# Patient Record
Sex: FEMALE | Race: WHITE | Employment: FULL TIME | URBAN - METROPOLITAN AREA
[De-identification: names, ages, dates, MRNs, and addresses within clinical notes are randomized per-mention and may not be internally consistent; named-entity substitution may affect disease eponyms.]

---

## 2024-04-06 ENCOUNTER — TELEPHONE (OUTPATIENT)
Dept: FAMILY MEDICINE CLINIC | Age: 49
End: 2024-04-06

## 2024-05-10 SDOH — HEALTH STABILITY: PHYSICAL HEALTH: ON AVERAGE, HOW MANY DAYS PER WEEK DO YOU ENGAGE IN MODERATE TO STRENUOUS EXERCISE (LIKE A BRISK WALK)?: 0 DAYS

## 2024-05-13 ENCOUNTER — OFFICE VISIT (OUTPATIENT)
Dept: FAMILY MEDICINE CLINIC | Age: 49
End: 2024-05-13
Payer: COMMERCIAL

## 2024-05-13 VITALS
SYSTOLIC BLOOD PRESSURE: 110 MMHG | WEIGHT: 170 LBS | DIASTOLIC BLOOD PRESSURE: 70 MMHG | HEART RATE: 85 BPM | HEIGHT: 64 IN | BODY MASS INDEX: 29.02 KG/M2 | OXYGEN SATURATION: 98 %

## 2024-05-13 DIAGNOSIS — Z11.59 ENCOUNTER FOR SCREENING FOR OTHER VIRAL DISEASES: ICD-10-CM

## 2024-05-13 DIAGNOSIS — G43.919 INTRACTABLE MIGRAINE WITHOUT STATUS MIGRAINOSUS, UNSPECIFIED MIGRAINE TYPE: Primary | ICD-10-CM

## 2024-05-13 DIAGNOSIS — L65.9 FALLING HAIR: ICD-10-CM

## 2024-05-13 DIAGNOSIS — Z90.3 S/P GASTRIC SLEEVE PROCEDURE: ICD-10-CM

## 2024-05-13 DIAGNOSIS — Z76.89 ENCOUNTER FOR WEIGHT MANAGEMENT: ICD-10-CM

## 2024-05-13 DIAGNOSIS — Z12.11 COLON CANCER SCREENING: ICD-10-CM

## 2024-05-13 DIAGNOSIS — K58.1 IRRITABLE BOWEL SYNDROME WITH CONSTIPATION: ICD-10-CM

## 2024-05-13 DIAGNOSIS — Z12.31 SCREENING MAMMOGRAM FOR HIGH-RISK PATIENT: ICD-10-CM

## 2024-05-13 PROCEDURE — 99204 OFFICE O/P NEW MOD 45 MIN: CPT | Performed by: FAMILY MEDICINE

## 2024-05-13 SDOH — ECONOMIC STABILITY: HOUSING INSECURITY
IN THE LAST 12 MONTHS, WAS THERE A TIME WHEN YOU DID NOT HAVE A STEADY PLACE TO SLEEP OR SLEPT IN A SHELTER (INCLUDING NOW)?: NO

## 2024-05-13 SDOH — ECONOMIC STABILITY: FOOD INSECURITY: WITHIN THE PAST 12 MONTHS, THE FOOD YOU BOUGHT JUST DIDN'T LAST AND YOU DIDN'T HAVE MONEY TO GET MORE.: NEVER TRUE

## 2024-05-13 SDOH — ECONOMIC STABILITY: FOOD INSECURITY: WITHIN THE PAST 12 MONTHS, YOU WORRIED THAT YOUR FOOD WOULD RUN OUT BEFORE YOU GOT MONEY TO BUY MORE.: NEVER TRUE

## 2024-05-13 SDOH — ECONOMIC STABILITY: INCOME INSECURITY: HOW HARD IS IT FOR YOU TO PAY FOR THE VERY BASICS LIKE FOOD, HOUSING, MEDICAL CARE, AND HEATING?: NOT HARD AT ALL

## 2024-05-13 ASSESSMENT — PATIENT HEALTH QUESTIONNAIRE - PHQ9
2. FEELING DOWN, DEPRESSED OR HOPELESS: NOT AT ALL
SUM OF ALL RESPONSES TO PHQ QUESTIONS 1-9: 0
1. LITTLE INTEREST OR PLEASURE IN DOING THINGS: NOT AT ALL
SUM OF ALL RESPONSES TO PHQ QUESTIONS 1-9: 0
SUM OF ALL RESPONSES TO PHQ9 QUESTIONS 1 & 2: 0

## 2024-05-13 ASSESSMENT — ENCOUNTER SYMPTOMS
ABDOMINAL DISTENTION: 0
SORE THROAT: 0
CHEST TIGHTNESS: 0
SHORTNESS OF BREATH: 0
CONSTIPATION: 0
NAUSEA: 0
BLOOD IN STOOL: 0
WHEEZING: 0
VOMITING: 0
ABDOMINAL PAIN: 0
EYE REDNESS: 0
RHINORRHEA: 0
COLOR CHANGE: 0
TROUBLE SWALLOWING: 0
DIARRHEA: 0
BACK PAIN: 0
SINUS PRESSURE: 0
STRIDOR: 0

## 2024-05-13 NOTE — PROGRESS NOTES
Chief Complaint   Patient presents with    New Patient    Weight Management     Has done nothing different from last year     Alopecia     Hair loss     Other         Leonor BOWMAN  here today for follow up on chronic medical problems, go over labs and/or diagnostic studies, and medication refills. New Patient, Weight Management (Has done nothing different from last year ), Alopecia (Hair loss ), and Other      HPI: Patient is scheduled to establish.  Patient reports she was at [employer] in the past .    Patient has history of migraine headaches follows with neurologist and is currently doing Botox.  Patient reports she was started on the Nurtec but insurance is not covering that.      Patient reports she had gastric bypass done about 4 years before and has lost about 90 pounds.  She has not been following with gastric bypass surgeon neither she had any recent blood work done.  Patient reports she has gained about 20 pounds.  She was down to 150s and today's weight is 170.  Patient reports she is still continuing the exercise around 10,000 steps not doing any resistance training.  Patient reports she still watches diet and takes portion plates but she has been persistently gaining weight.    Patient wants to discuss about the weight loss, reports she does not want to gain more weight.     Patient also complains of hair fall reports she had COVID in November and since then her hair is falling.  She is not taking any multivitamins.  Patient denies any patchy hair loss.      Patient has irritable bowel syndrome follows with GI and is currently on Linzess.  Patient reports she has colonoscopy done results are in care everywhere.      Patient is also due for mammogram did not had any since last few years.        Patient she has hysterectomy done and follows with gynecologist.  Patient does not qualify for Pap smear.  Reports she had recent pelvic exam done.      /70   Pulse 85   Ht 1.626 m (5' 4.02\")   Wt

## 2024-05-13 NOTE — PROGRESS NOTES
Visit Information    Have you changed or started any medications since your last visit including any over-the-counter medicines, vitamins, or herbal medicines? no   Are you having any side effects from any of your medications? -  no  Have you stopped taking any of your medications? Is so, why? -  no    Have you seen any other physician or provider since your last visit? No  Have you had any other diagnostic tests since your last visit? No  Have you been seen in the emergency room and/or had an admission to a hospital since we last saw you? No  Have you had your routine dental cleaning in the past 6 months? no    Have you activated your [employer] account? If not, what are your barriers? Yes     Patient Care Team:  Rolo Elias MD as PCP - General (Family Medicine)  Surendra Sun DO as Consulting Physician (Obstetrics & Gynecology)    Medical History Review  Past Medical, Family, and Social History reviewed and does contribute to the patient presenting condition    Health Maintenance   Topic Date Due    Depression Screen  Never done    Hepatitis C screen  Never done    Hepatitis B vaccine (3 of 3 - 19+ 3-dose series) 11/20/2009    Lipids  Never done    Breast cancer screen  03/14/2019    Colorectal Cancer Screen  Never done    COVID-19 Vaccine (6 - 2023-24 season) 09/01/2023    Flu vaccine (Season Ended) 08/01/2024    DTaP/Tdap/Td vaccine (2 - Td or Tdap) 10/07/2027    HIV screen  Completed    Hepatitis A vaccine  Aged Out    Hib vaccine  Aged Out    Polio vaccine  Aged Out    Meningococcal (ACWY) vaccine  Aged Out    Pneumococcal 0-64 years Vaccine  Aged Out    Cervical cancer screen  Discontinued

## 2024-05-13 NOTE — PATIENT INSTRUCTIONS
Dear valued patient,    We hope this message finds you in good health. At [], we are committed to providing you with the best possible healthcare experience. To further enhance your convenience and streamline your access to our services, we would like to introduce you to the benefits of utilizing direct scheduling through the [facility] Patient Portal.    Direct scheduling is a feature within the [facility] Patient Portal that allows you to schedule appointments with your healthcare provider directly, without the need to make a phone call or wait for a callback. We understand that your time is rina, and we want to ensure that you have quick and easy access to our services whenever you need them.  Here are some reasons why you should consider utilizing direct scheduling through the [facility] Patient Portal:    1. Convenience: With direct scheduling, you can book appointments at any time that suits you best, 24 hours a day, 7 days a week. No more waiting on hold or playing phone tag with our office staff. It puts you in control of managing your healthcare appointments effortlessly.    2. Accessibility: The [facility] Patient Portal is accessible through your computer, smartphone, or tablet, allowing you to schedule appointments from the comfort of your home, office, or on the go. You can access your medical records, review test results, and communicate with your healthcare provider all in one secure and user-friendly platform.    3. Timesaving: By utilizing direct scheduling, you can avoid unnecessary delays and save rina time. You can easily browse the available appointment slots and select the one that works best for you, eliminating the back-and-forth communication typically required when scheduling via phone.    4. Reminders and notifications: [facility] sends automatic reminders for upcoming appointments, ensuring that you never miss an important visit. You can also receive notifications about test

## 2024-05-29 ENCOUNTER — HOSPITAL ENCOUNTER (OUTPATIENT)
Age: 49
Setting detail: SPECIMEN
Discharge: HOME | End: 2024-05-29

## 2024-05-29 DIAGNOSIS — Z90.3 S/P GASTRIC SLEEVE PROCEDURE: ICD-10-CM

## 2024-05-29 DIAGNOSIS — Z11.59 ENCOUNTER FOR SCREENING FOR OTHER VIRAL DISEASES: ICD-10-CM

## 2024-05-29 LAB
25(OH)D3 SERPL-MCNC: 21.4 NG/ML (ref 30–100)
ALBUMIN SERPL-MCNC: 4.3 G/DL (ref 3.5–5.2)
ALBUMIN/GLOB SERPL: 2 {RATIO} (ref 1–2.5)
ALP SERPL-CCNC: 64 U/L (ref 35–104)
ALT SERPL-CCNC: 12 U/L (ref 10–35)
ANION GAP SERPL CALCULATED.3IONS-SCNC: 10 MMOL/L (ref 9–16)
AST SERPL-CCNC: 24 U/L (ref 10–35)
BILIRUB SERPL-MCNC: 0.7 MG/DL (ref 0–1.2)
BILIRUB UR QL STRIP: NEGATIVE
BUN SERPL-MCNC: 15 MG/DL (ref 6–20)
CALCIUM SERPL-MCNC: 8.4 MG/DL (ref 8.6–10.4)
CHLORIDE SERPL-SCNC: 105 MMOL/L (ref 98–107)
CHOLEST SERPL-MCNC: 150 MG/DL (ref 0–199)
CHOLESTEROL/HDL RATIO: 2
CLARITY UR: CLEAR
CO2 SERPL-SCNC: 25 MMOL/L (ref 20–31)
COLOR UR: YELLOW
COMMENT: NORMAL
CREAT SERPL-MCNC: 0.9 MG/DL (ref 0.5–0.9)
ERYTHROCYTE [DISTWIDTH] IN BLOOD BY AUTOMATED COUNT: 16.1 % (ref 11.8–14.4)
EST. AVERAGE GLUCOSE BLD GHB EST-MCNC: 85 MG/DL
FOLATE SERPL-MCNC: 11.3 NG/ML (ref 4.8–24.2)
GFR, ESTIMATED: 84 ML/MIN/1.73M2
GLUCOSE SERPL-MCNC: 72 MG/DL (ref 74–99)
GLUCOSE UR STRIP-MCNC: NEGATIVE MG/DL
HBA1C MFR BLD: 4.6 % (ref 4–6)
HBV SURFACE AB SERPL IA-ACNC: 67.6 MIU/ML
HCT VFR BLD AUTO: 34.8 % (ref 36.3–47.1)
HCV AB SERPL QL IA: NONREACTIVE
HDLC SERPL-MCNC: 100 MG/DL
HGB BLD-MCNC: 10.2 G/DL (ref 11.9–15.1)
HGB UR QL STRIP.AUTO: NEGATIVE
KETONES UR STRIP-MCNC: NEGATIVE MG/DL
LDLC SERPL CALC-MCNC: 38 MG/DL (ref 0–100)
LEUKOCYTE ESTERASE UR QL STRIP: NEGATIVE
MAGNESIUM SERPL-MCNC: 1.9 MG/DL (ref 1.6–2.6)
MCH RBC QN AUTO: 24.2 PG (ref 25.2–33.5)
MCHC RBC AUTO-ENTMCNC: 29.3 G/DL (ref 28.4–34.8)
MCV RBC AUTO: 82.5 FL (ref 82.6–102.9)
NITRITE UR QL STRIP: NEGATIVE
NRBC BLD-RTO: 0 PER 100 WBC
PH UR STRIP: 6.5 [PH] (ref 5–8)
PLATELET # BLD AUTO: 220 K/UL (ref 138–453)
PMV BLD AUTO: 10.6 FL (ref 8.1–13.5)
POTASSIUM SERPL-SCNC: 3.9 MMOL/L (ref 3.7–5.3)
PROT SERPL-MCNC: 6.9 G/DL (ref 6.6–8.7)
PROT UR STRIP-MCNC: NEGATIVE MG/DL
RBC # BLD AUTO: 4.22 M/UL (ref 3.95–5.11)
SODIUM SERPL-SCNC: 140 MMOL/L (ref 136–145)
SP GR UR STRIP: 1.01 (ref 1–1.03)
TRIGL SERPL-MCNC: 62 MG/DL
TSH SERPL DL<=0.05 MIU/L-ACNC: 1.38 UIU/ML (ref 0.27–4.2)
URATE SERPL-MCNC: 3.5 MG/DL (ref 2.4–5.7)
UROBILINOGEN UR STRIP-ACNC: NORMAL EU/DL (ref 0–1)
VIT B12 SERPL-MCNC: 430 PG/ML (ref 232–1245)
VLDLC SERPL CALC-MCNC: 12 MG/DL
WBC OTHER # BLD: 3 K/UL (ref 3.5–11.3)

## 2024-06-01 LAB — ZINC SERPL-MCNC: 75.6 UG/DL (ref 60–120)

## 2024-06-02 LAB
RETINYL PALMITATE: <0.02 MG/L (ref 0–0.1)
VITAMIN A LEVEL: 0.49 MG/L (ref 0.3–1.2)
VITAMIN A, INTERP: NORMAL

## 2024-06-03 DIAGNOSIS — E55.9 VITAMIN D DEFICIENCY: ICD-10-CM

## 2024-06-03 DIAGNOSIS — D50.8 IRON DEFICIENCY ANEMIA SECONDARY TO INADEQUATE DIETARY IRON INTAKE: Primary | ICD-10-CM

## 2024-06-03 LAB — VIT B1 PYROPHOSHATE BLD-SCNC: 92 NMOL/L (ref 70–180)

## 2024-06-03 RX ORDER — FERROUS SULFATE 325(65) MG
325 TABLET ORAL 2 TIMES DAILY
Qty: 180 TABLET | Refills: 1 | Status: SHIPPED | OUTPATIENT
Start: 2024-06-03

## 2024-06-03 RX ORDER — ERGOCALCIFEROL 1.25 MG/1
50000 CAPSULE ORAL WEEKLY
Qty: 12 CAPSULE | Refills: 1 | Status: SHIPPED | OUTPATIENT
Start: 2024-06-03

## 2024-07-29 NOTE — PROGRESS NOTES
of water per day unless you are limiting fluids under the direction of a healthcare professional.  Take abortive medications as soon as possible with a full glass of water.     2.  Return to clinic ASAP for Botox.        I personally reviewed all scans and labs unless otherwise stated above.  Notes from other providers were reviewed prior to this visit as well.  All of the above findings and plans were discussed with the patient.       Thank you for allowing us to participate in the care of this nice patient.     Sincerely,     Jolanta Little DO  Neurology  Director of Multiple Sclerosis & Neuroimmunology  [facility]         [Chaperone Present] : A chaperone was present in the examining room during all aspects of the physical examination [Appropriately responsive] : appropriately responsive [Alert] : alert [No Acute Distress] : no acute distress [Oriented x3] : oriented x3

## 2024-07-30 ENCOUNTER — TELEPHONE (OUTPATIENT)
Dept: NEUROLOGY | Age: 49
End: 2024-07-30

## 2024-07-30 ENCOUNTER — OFFICE VISIT (OUTPATIENT)
Dept: NEUROLOGY | Age: 49
End: 2024-07-30
Payer: COMMERCIAL

## 2024-07-30 VITALS
SYSTOLIC BLOOD PRESSURE: 145 MMHG | OXYGEN SATURATION: 100 % | RESPIRATION RATE: 16 BRPM | HEART RATE: 56 BPM | BODY MASS INDEX: 29.71 KG/M2 | HEIGHT: 64 IN | WEIGHT: 174 LBS | DIASTOLIC BLOOD PRESSURE: 89 MMHG

## 2024-07-30 DIAGNOSIS — G43.709 CHRONIC MIGRAINE W/O AURA W/O STATUS MIGRAINOSUS, NOT INTRACTABLE: Primary | ICD-10-CM

## 2024-07-30 DIAGNOSIS — G43.109 MIGRAINE WITH AURA AND WITHOUT STATUS MIGRAINOSUS, NOT INTRACTABLE: ICD-10-CM

## 2024-07-30 PROCEDURE — 99204 OFFICE O/P NEW MOD 45 MIN: CPT | Performed by: PSYCHIATRY & NEUROLOGY

## 2024-07-30 RX ORDER — ESTRADIOL 0.1 MG/D
1 FILM, EXTENDED RELEASE TRANSDERMAL
COMMUNITY

## 2024-07-30 NOTE — TELEPHONE ENCOUNTER
----- Message from Jolanta AKBAR DO sent at 7/30/2024  2:59 PM EDT -----  Regarding: Botox and Nurtec  Can you guys help me get her Botox ASAP?  She is due now.  She also needs a prior auth for Nurtec.     Thanks!    AVB

## 2024-08-01 ENCOUNTER — TELEPHONE (OUTPATIENT)
Dept: FAMILY MEDICINE CLINIC | Age: 49
End: 2024-08-01

## 2024-08-01 ENCOUNTER — TELEPHONE (OUTPATIENT)
Dept: NEUROLOGY | Age: 49
End: 2024-08-01

## 2024-08-01 NOTE — TELEPHONE ENCOUNTER
I called Leonor to verify her insurance because she had both [place] and [place] listedf. She said she has only [employer].   That does not require a prior authorization. Will check with dr. Little if she wants to work her in on Monday or Wednesday of next week or the following Monday.

## 2024-08-02 ENCOUNTER — TELEPHONE (OUTPATIENT)
Dept: NEUROLOGY | Age: 49
End: 2024-08-02

## 2024-08-05 ENCOUNTER — OFFICE VISIT (OUTPATIENT)
Dept: NEUROLOGY | Age: 49
End: 2024-08-05
Payer: COMMERCIAL

## 2024-08-05 DIAGNOSIS — G43.709 CHRONIC MIGRAINE W/O AURA W/O STATUS MIGRAINOSUS, NOT INTRACTABLE: Primary | ICD-10-CM

## 2024-08-05 PROCEDURE — 64615 CHEMODENERV MUSC MIGRAINE: CPT | Performed by: PSYCHIATRY & NEUROLOGY

## 2024-08-05 NOTE — PROGRESS NOTES
[facility]  [address], [address]  [place] [address]  Ph: [phone] or [phone]  FAX: [phone]        Procedure: Chemodenervation CPT Code 03891    Indication for treatment: Chronic migraine (ICD 10 G43.709)  Consent form was signed.      Potential risks and benefits for the procedure were explained to the patient.      Procedure: 30-gauge half inch needle was used and 200 U vial Botox was prepared with 4ml 0.9% NS.155 units of Botox were used and 45 units of Botox were discarded.     Procedure: 30-gauge half inch needle was used and 200 U vial Botox was prepared with 4ml 0.9% NS.155 units of Botox were used and 45 units of Botox were discarded.     Botox was injected at 31 different sites as follows:   Order  Muscle  Units injected    A  Corrugator1  10 units at 2 div sites    B  Procerus  5 Units in 1 site    C  Frontalis¹  20 Units div. 4 sites    D  Temporalis¹  40 Units div 8 site    E  Occipitalis¹  30 Units div. 6 sites    F  Cervical paraspinal muscles¹  20 Units div 4 sites    G  Trapezius¹  30 Units div 6 sites    Total Dose  155 Units div 31 sites    2 Dose distributed bilaterally    Blood loss: Less than 1 cc    Complications: none    Disposition: Post-botox instructions were reviewed and provided to the patient. Patient was advised to seek medical care for any generalized muscle weakness, double vision, ptosis, trouble swallowing, difficulty talking or difficulty breathing.  The patient will return in 3 months for subsequent botox treatments.       Jolanta Little DO   Neurology   Director of Multiple Sclerosis & Neuroimmunology  [facility]

## 2024-08-06 NOTE — TELEPHONE ENCOUNTER
PA approved through 2/1/2025 for 18 tablets per 30 days.     Attempted to notify patient; a message was left with this information as well as the office phone number in the event there are further questions.

## 2024-09-10 ENCOUNTER — OFFICE VISIT (OUTPATIENT)
Dept: FAMILY MEDICINE CLINIC | Age: 49
End: 2024-09-10
Payer: COMMERCIAL

## 2024-09-10 VITALS
OXYGEN SATURATION: 98 % | DIASTOLIC BLOOD PRESSURE: 80 MMHG | HEIGHT: 64 IN | BODY MASS INDEX: 29.71 KG/M2 | SYSTOLIC BLOOD PRESSURE: 120 MMHG | WEIGHT: 174 LBS | HEART RATE: 59 BPM

## 2024-09-10 DIAGNOSIS — E55.9 VITAMIN D DEFICIENCY: ICD-10-CM

## 2024-09-10 DIAGNOSIS — J34.3 NASAL TURBINATE HYPERTROPHY: ICD-10-CM

## 2024-09-10 DIAGNOSIS — J30.2 SEASONAL ALLERGIES: ICD-10-CM

## 2024-09-10 DIAGNOSIS — E66.09 CLASS 1 OBESITY DUE TO EXCESS CALORIES WITHOUT SERIOUS COMORBIDITY WITH BODY MASS INDEX (BMI) OF 30.0 TO 30.9 IN ADULT: ICD-10-CM

## 2024-09-10 DIAGNOSIS — D50.8 OTHER IRON DEFICIENCY ANEMIA: ICD-10-CM

## 2024-09-10 DIAGNOSIS — Z90.3 S/P GASTRIC SLEEVE PROCEDURE: ICD-10-CM

## 2024-09-10 DIAGNOSIS — Z76.89 ENCOUNTER FOR WEIGHT MANAGEMENT: ICD-10-CM

## 2024-09-10 DIAGNOSIS — D70.9 NEUTROPENIA, UNSPECIFIED TYPE (HCC): Primary | ICD-10-CM

## 2024-09-10 PROBLEM — D64.9 ABSOLUTE ANEMIA: Status: ACTIVE | Noted: 2024-09-10

## 2024-09-10 PROCEDURE — 99214 OFFICE O/P EST MOD 30 MIN: CPT | Performed by: FAMILY MEDICINE

## 2024-09-10 RX ORDER — PHENTERMINE HYDROCHLORIDE 37.5 MG/1
37.5 TABLET ORAL
Qty: 30 TABLET | Refills: 0 | Status: SHIPPED | OUTPATIENT
Start: 2024-09-10 | End: 2024-10-10

## 2024-09-10 RX ORDER — CETIRIZINE HYDROCHLORIDE, PSEUDOEPHEDRINE HYDROCHLORIDE 5; 120 MG/1; MG/1
1 TABLET, FILM COATED, EXTENDED RELEASE ORAL DAILY
Qty: 60 TABLET | Refills: 0 | Status: SHIPPED | OUTPATIENT
Start: 2024-09-10 | End: 2024-11-09

## 2024-09-10 RX ORDER — FLUTICASONE PROPIONATE 50 MCG
1 SPRAY, SUSPENSION (ML) NASAL DAILY
Qty: 32 G | Refills: 1 | Status: SHIPPED | OUTPATIENT
Start: 2024-09-10

## 2024-09-10 SDOH — ECONOMIC STABILITY: FOOD INSECURITY: WITHIN THE PAST 12 MONTHS, YOU WORRIED THAT YOUR FOOD WOULD RUN OUT BEFORE YOU GOT MONEY TO BUY MORE.: NEVER TRUE

## 2024-09-10 SDOH — ECONOMIC STABILITY: INCOME INSECURITY: HOW HARD IS IT FOR YOU TO PAY FOR THE VERY BASICS LIKE FOOD, HOUSING, MEDICAL CARE, AND HEATING?: NOT HARD AT ALL

## 2024-09-10 SDOH — ECONOMIC STABILITY: FOOD INSECURITY: WITHIN THE PAST 12 MONTHS, THE FOOD YOU BOUGHT JUST DIDN'T LAST AND YOU DIDN'T HAVE MONEY TO GET MORE.: NEVER TRUE

## 2024-09-10 ASSESSMENT — ENCOUNTER SYMPTOMS
BLOOD IN STOOL: 0
CONSTIPATION: 1
TROUBLE SWALLOWING: 0
COLOR CHANGE: 0
VOMITING: 0
EYE REDNESS: 0
SHORTNESS OF BREATH: 0
RHINORRHEA: 0
CHEST TIGHTNESS: 0
SINUS PRESSURE: 1
NAUSEA: 0
STRIDOR: 0
ABDOMINAL DISTENTION: 0
BACK PAIN: 0
SORE THROAT: 0
DIARRHEA: 0
VOICE CHANGE: 0
COUGH: 0
EYE DISCHARGE: 1
EYE ITCHING: 1
ABDOMINAL PAIN: 0
RECTAL PAIN: 0
WHEEZING: 0

## 2024-09-10 ASSESSMENT — PATIENT HEALTH QUESTIONNAIRE - PHQ9
2. FEELING DOWN, DEPRESSED OR HOPELESS: NOT AT ALL
SUM OF ALL RESPONSES TO PHQ QUESTIONS 1-9: 0
SUM OF ALL RESPONSES TO PHQ QUESTIONS 1-9: 0
1. LITTLE INTEREST OR PLEASURE IN DOING THINGS: NOT AT ALL
SUM OF ALL RESPONSES TO PHQ QUESTIONS 1-9: 0
SUM OF ALL RESPONSES TO PHQ QUESTIONS 1-9: 0
SUM OF ALL RESPONSES TO PHQ9 QUESTIONS 1 & 2: 0

## 2024-09-11 RX ORDER — OLOPATADINE HYDROCHLORIDE 2 MG/ML
SOLUTION/ DROPS OPHTHALMIC
Qty: 2.5 ML | OUTPATIENT
Start: 2024-09-11

## 2024-09-25 LAB
CHOLEST SERPL-MCNC: 145 MG/DL (ref 0–199)
CHOLESTEROL/HDL RATIO: 1
GLUCOSE SERPL-MCNC: 80 MG/DL (ref 74–99)
HDLC SERPL-MCNC: 102 MG/DL
LDLC SERPL CALC-MCNC: 33 MG/DL (ref 0–100)
PATIENT FASTING?: YES
TRIGL SERPL-MCNC: 51 MG/DL
VLDLC SERPL CALC-MCNC: 10 MG/DL

## 2024-10-01 RX ORDER — OLOPATADINE HYDROCHLORIDE 2 MG/ML
SOLUTION/ DROPS OPHTHALMIC
Qty: 2.5 ML | OUTPATIENT
Start: 2024-10-01

## 2024-10-09 ENCOUNTER — HOSPITAL ENCOUNTER (OUTPATIENT)
Age: 49
Discharge: HOME | End: 2024-10-09
Payer: COMMERCIAL

## 2024-10-09 ENCOUNTER — OFFICE VISIT (OUTPATIENT)
Dept: FAMILY MEDICINE CLINIC | Age: 49
End: 2024-10-09
Payer: COMMERCIAL

## 2024-10-09 VITALS
HEIGHT: 64 IN | SYSTOLIC BLOOD PRESSURE: 118 MMHG | OXYGEN SATURATION: 100 % | DIASTOLIC BLOOD PRESSURE: 70 MMHG | WEIGHT: 166.2 LBS | HEART RATE: 65 BPM | BODY MASS INDEX: 28.38 KG/M2

## 2024-10-09 DIAGNOSIS — D70.9 NEUTROPENIA, UNSPECIFIED TYPE (HCC): Primary | ICD-10-CM

## 2024-10-09 DIAGNOSIS — Z90.3 S/P GASTRIC SLEEVE PROCEDURE: ICD-10-CM

## 2024-10-09 DIAGNOSIS — E55.9 VITAMIN D DEFICIENCY: ICD-10-CM

## 2024-10-09 DIAGNOSIS — D70.9 NEUTROPENIA, UNSPECIFIED TYPE (HCC): ICD-10-CM

## 2024-10-09 DIAGNOSIS — E66.811 CLASS 1 OBESITY DUE TO EXCESS CALORIES WITHOUT SERIOUS COMORBIDITY WITH BODY MASS INDEX (BMI) OF 30.0 TO 30.9 IN ADULT: ICD-10-CM

## 2024-10-09 DIAGNOSIS — E66.09 CLASS 1 OBESITY DUE TO EXCESS CALORIES WITHOUT SERIOUS COMORBIDITY WITH BODY MASS INDEX (BMI) OF 30.0 TO 30.9 IN ADULT: ICD-10-CM

## 2024-10-09 LAB
BASOPHILS # BLD: 0 K/UL (ref 0–0.2)
BASOPHILS NFR BLD: 0 % (ref 0–2)
EOSINOPHIL # BLD: 0 K/UL (ref 0–0.4)
EOSINOPHILS RELATIVE PERCENT: 0 % (ref 0–4)
ERYTHROCYTE [DISTWIDTH] IN BLOOD BY AUTOMATED COUNT: 17.7 % (ref 11.5–14.9)
HCT VFR BLD AUTO: 36.7 % (ref 36–46)
HGB BLD-MCNC: 11.8 G/DL (ref 12–16)
LYMPHOCYTES NFR BLD: 0.66 K/UL (ref 1–4.8)
LYMPHOCYTES RELATIVE PERCENT: 20 % (ref 24–44)
MCH RBC QN AUTO: 26 PG (ref 26–34)
MCHC RBC AUTO-ENTMCNC: 32.2 G/DL (ref 31–37)
MCV RBC AUTO: 80.6 FL (ref 80–100)
MONOCYTES NFR BLD: 0.17 K/UL (ref 0.1–1.3)
MONOCYTES NFR BLD: 5 % (ref 1–7)
MORPHOLOGY: ABNORMAL
MORPHOLOGY: ABNORMAL
NEUTROPHILS NFR BLD: 75 % (ref 36–66)
NEUTS SEG NFR BLD: 2.47 K/UL (ref 1.3–9.1)
PLATELET # BLD AUTO: 223 K/UL (ref 150–450)
PMV BLD AUTO: 8 FL (ref 6–12)
RBC # BLD AUTO: 4.56 M/UL (ref 4–5.2)
WBC OTHER # BLD: 3.3 K/UL (ref 3.5–11)

## 2024-10-09 PROCEDURE — 99214 OFFICE O/P EST MOD 30 MIN: CPT | Performed by: FAMILY MEDICINE

## 2024-10-09 PROCEDURE — 85025 COMPLETE CBC W/AUTO DIFF WBC: CPT

## 2024-10-09 PROCEDURE — 36415 COLL VENOUS BLD VENIPUNCTURE: CPT

## 2024-10-09 RX ORDER — PHENTERMINE HYDROCHLORIDE 37.5 MG/1
37.5 TABLET ORAL
Qty: 30 TABLET | Refills: 0 | Status: SHIPPED | OUTPATIENT
Start: 2024-10-09 | End: 2024-11-08

## 2024-10-09 RX ORDER — ERGOCALCIFEROL 1.25 MG/1
50000 CAPSULE, LIQUID FILLED ORAL WEEKLY
Qty: 12 CAPSULE | Refills: 1 | Status: SHIPPED | OUTPATIENT
Start: 2024-10-09

## 2024-10-09 ASSESSMENT — ENCOUNTER SYMPTOMS
BLOOD IN STOOL: 0
SINUS PRESSURE: 0
ABDOMINAL PAIN: 0
RECTAL PAIN: 0
COUGH: 0
DIARRHEA: 0
CONSTIPATION: 0
STRIDOR: 0
WHEEZING: 0
NAUSEA: 0
TROUBLE SWALLOWING: 0
RHINORRHEA: 0
VOMITING: 0
EYE REDNESS: 0
ABDOMINAL DISTENTION: 0
SHORTNESS OF BREATH: 0
BACK PAIN: 0
COLOR CHANGE: 0
CHEST TIGHTNESS: 0
SORE THROAT: 0

## 2024-10-09 NOTE — PROGRESS NOTES
Chief Complaint   Patient presents with    Weight Management         Leonor BOWMAN  here today for follow up on chronic medical problems, go over labs and/or diagnostic studies, and medication refills. Weight Management      HPI: Patient is scheduled for follow-up on weight management.  Patient was started on Adipex has history of gastric bypass done in the past.  Patient lost about 8 pounds in the last 1 month.  She continues to walk daily at work.  Patient reports she completes 12,000 steps.  She has decreased appetite, reports she has cut down on carbs and fats.      Patient has CBC done few months before that was showing low white cell count.  She has repeat CBC ordered she did not got that done  .  Discussed with patient to do the blood work.  Wants to lose weight.    Leonor BOWMAN was started on Adipex.  Patient is here for refill of Adipex #2     In addition to the medication, patient also using diet and exercise as follows:  -diet:  -exercise:    Medication side effects: None.     Patient denies anorexia, nausea, vomiting, abdominal pain, involuntary weight loss, palpitations, insomnia, and irritability.    Urine drug test done not done will do at next appointment    LMP:     Contraceptive method: Hysterectomy    Patient informed that when prescribed a controlled substance for weight loss, the provider is required by law to see the patient for an appointment every thirty days. This is neccessary to record the weight and blood pressure and to assess patient's efforts to lose weight, and to ensure there are no contraindications or adverse effects.   Patient advised contraception during the time of taking this medication, advised no alcohol use during this time      blood pressure is Normal.  BP Readings from Last 3 Encounters:   10/09/24 118/70   09/10/24 120/80   07/30/24 (!) 145/89        Pulse is Normal.     Pulse Readings from Last 3 Encounters:   10/09/24 65   09/10/24 59   07/30/24 56

## 2024-10-09 NOTE — PROGRESS NOTES
Visit Information    Have you changed or started any medications since your last visit including any over-the-counter medicines, vitamins, or herbal medicines? no   Are you having any side effects from any of your medications? -  no  Have you stopped taking any of your medications? Is so, why? -  no    Have you seen any other physician or provider since your last visit? No  Have you had any other diagnostic tests since your last visit? No  Have you been seen in the emergency room and/or had an admission to a hospital since we last saw you? No  Have you had your routine dental cleaning in the past 6 months? no    Have you activated your [employer] account? If not, what are your barriers? Yes     Patient Care Team:  Rolo Elias MD as PCP - General (Family Medicine)  Rolo Elias MD as PCP - Empaneled Provider  Surendra Sun DO as Consulting Physician (Obstetrics & Gynecology)    Medical History Review  Past Medical, Family, and Social History reviewed and does contribute to the patient presenting condition    Health Maintenance   Topic Date Due    Breast cancer screen  03/14/2019    Flu vaccine (1) 08/01/2024    COVID-19 Vaccine (4 - 2023-24 season) 09/01/2024    Depression Screen  09/10/2025    DTaP/Tdap/Td vaccine (2 - Td or Tdap) 10/07/2027    Colorectal Cancer Screen  07/25/2029    Lipids  09/25/2029    Hepatitis C screen  Completed    HIV screen  Completed    Hepatitis A vaccine  Aged Out    Hib vaccine  Aged Out    Polio vaccine  Aged Out    Meningococcal (ACWY) vaccine  Aged Out    Pneumococcal 0-64 years Vaccine  Aged Out    Hepatitis B vaccine  Discontinued    Diabetes screen  Discontinued    Cervical cancer screen  Discontinued

## 2024-11-01 ENCOUNTER — TELEPHONE (OUTPATIENT)
Dept: NEUROLOGY | Age: 49
End: 2024-11-01

## 2024-11-01 NOTE — TELEPHONE ENCOUNTER
Left message for patient to call to get a follow up scheduled after her botox appointment on 11/6/2024.

## 2024-11-06 ENCOUNTER — OFFICE VISIT (OUTPATIENT)
Dept: NEUROLOGY | Age: 49
End: 2024-11-06
Payer: COMMERCIAL

## 2024-11-06 DIAGNOSIS — G43.709 CHRONIC MIGRAINE W/O AURA W/O STATUS MIGRAINOSUS, NOT INTRACTABLE: Primary | ICD-10-CM

## 2024-11-06 PROCEDURE — 64615 CHEMODENERV MUSC MIGRAINE: CPT | Performed by: PSYCHIATRY & NEUROLOGY

## 2024-11-06 NOTE — PROGRESS NOTES
[facility]  [address], [address]  [place] [address]  Ph: [phone] or [phone]  FAX: [phone]        Procedure: Chemodenervation CPT Code 76620    Indication for treatment: Chronic migraine (ICD 10 G43.709)  Consent form was signed.      Potential risks and benefits for the procedure were explained to the patient.      Procedure: 30-gauge half inch needle was used and 200 U vial Botox was prepared with 4ml 0.9% NS.155 units of Botox were used and 45 units of Botox were discarded.     Procedure: 30-gauge half inch needle was used and 200 U vial Botox was prepared with 4ml 0.9% NS.155 units of Botox were used and 45 units of Botox were discarded.     Botox was injected at 31 different sites as follows:   Order  Muscle  Units injected    A  Corrugator1  10 units at 2 div sites    B  Procerus  5 Units in 1 site    C  Frontalis¹  20 Units div. 4 sites    D  Temporalis¹  40 Units div 8 site    E  Occipitalis¹  30 Units div. 6 sites    F  Cervical paraspinal muscles¹  20 Units div 4 sites    G  Trapezius¹  30 Units div 6 sites    Total Dose  155 Units div 31 sites    2 Dose distributed bilaterally    Blood loss: Less than 1 cc    Complications: none    Disposition: Post-botox instructions were reviewed and provided to the patient. Patient was advised to seek medical care for any generalized muscle weakness, double vision, ptosis, trouble swallowing, difficulty talking or difficulty breathing.  The patient will return in 3 months for subsequent botox treatments.       Jolanta Little DO   Neurology   Director of Multiple Sclerosis & Neuroimmunology  [facility]

## 2024-11-20 ENCOUNTER — OFFICE VISIT (OUTPATIENT)
Dept: FAMILY MEDICINE CLINIC | Age: 49
End: 2024-11-20

## 2024-11-20 VITALS
DIASTOLIC BLOOD PRESSURE: 70 MMHG | HEIGHT: 64 IN | WEIGHT: 162 LBS | BODY MASS INDEX: 27.66 KG/M2 | SYSTOLIC BLOOD PRESSURE: 110 MMHG | HEART RATE: 74 BPM | OXYGEN SATURATION: 98 %

## 2024-11-20 DIAGNOSIS — Z90.3 S/P GASTRIC SLEEVE PROCEDURE: Primary | ICD-10-CM

## 2024-11-20 DIAGNOSIS — Z76.89 ENCOUNTER FOR WEIGHT MANAGEMENT: ICD-10-CM

## 2024-11-20 DIAGNOSIS — D50.8 OTHER IRON DEFICIENCY ANEMIA: ICD-10-CM

## 2024-11-20 DIAGNOSIS — G43.019 INTRACTABLE MIGRAINE WITHOUT AURA AND WITHOUT STATUS MIGRAINOSUS: ICD-10-CM

## 2024-11-20 DIAGNOSIS — E66.3 OVERWEIGHT (BMI 25.0-29.9): ICD-10-CM

## 2024-11-20 PROBLEM — D70.9 NEUTROPENIA (HCC): Status: RESOLVED | Noted: 2024-09-10 | Resolved: 2024-11-20

## 2024-11-20 RX ORDER — PHENTERMINE HYDROCHLORIDE 37.5 MG/1
37.5 TABLET ORAL
Qty: 30 TABLET | Refills: 0 | Status: SHIPPED | OUTPATIENT
Start: 2024-11-20 | End: 2024-12-20

## 2024-11-20 RX ORDER — MIRABEGRON 50 MG/1
50 TABLET, FILM COATED, EXTENDED RELEASE ORAL DAILY
COMMUNITY

## 2024-11-20 ASSESSMENT — ENCOUNTER SYMPTOMS
RECTAL PAIN: 0
EYE REDNESS: 0
COUGH: 0
COLOR CHANGE: 0
SORE THROAT: 0
NAUSEA: 0
WHEEZING: 0
SINUS PRESSURE: 0
BLOOD IN STOOL: 0
DIARRHEA: 0
STRIDOR: 0
CHEST TIGHTNESS: 0
CONSTIPATION: 0
ABDOMINAL DISTENTION: 0
SHORTNESS OF BREATH: 0
TROUBLE SWALLOWING: 0
BACK PAIN: 0
RHINORRHEA: 0
VOMITING: 0
ABDOMINAL PAIN: 0

## 2024-11-20 NOTE — PROGRESS NOTES
Chief Complaint   Patient presents with    Weight Management     Adipex          Leonor BOWMAN  here today for follow up on chronic medical problems, go over labs and/or diagnostic studies, and medication refills. Weight Management (Adipex )      HPI: Patient is scheduled for weight management.    Patient has history of gastric bypass was started on Adipex recently due to weight gain.  Patient is doing well on the medication completed 2 months and lost about 8 to 10 pounds.  Patient is still walking daily, does not need additional training.  Discussed with patient to start resistance training, she is watching her diet.  Patient denies any side effects from the medication.  Patient denies any headaches, reports she does have a history of mild headaches but they are stable.    Blood pressure is running normal.    Patient has mild neutropenia which has improved on the recent blood work, also has mild iron deficiency anemia that is improving.  Patient has iron deficiency due to gastric bypass, all other blood work was normal.  Patient denies any dependence from the medication.        Wants to lose weight.    Leonor BOWMAN was started on Adipex.  Patient is here for refill of Adipex #3     In addition to the medication, patient also using diet and exercise as follows:  -diet:  -exercise:    Medication side effects: None.     Patient denies anorexia, nausea, vomiting, insomnia, and irritability.    Urine drug test done , not done         Contraceptive method: Hysterectomy    Patient informed that when prescribed a controlled substance for weight loss, the provider is required by law to see the patient for an appointment every thirty days. This is neccessary to record the weight and blood pressure and to assess patient's efforts to lose weight, and to ensure there are no contraindications or adverse effects.   Patient advised contraception during the time of taking this medication, advised no alcohol use during

## 2024-11-20 NOTE — PROGRESS NOTES
Visit Information    Have you changed or started any medications since your last visit including any over-the-counter medicines, vitamins, or herbal medicines? no   Are you having any side effects from any of your medications? -  no  Have you stopped taking any of your medications? Is so, why? -  no    Have you seen any other physician or provider since your last visit? No  Have you had any other diagnostic tests since your last visit? No  Have you been seen in the emergency room and/or had an admission to a hospital since we last saw you? No  Have you had your routine dental cleaning in the past 6 months? no    Have you activated your [employer] account? If not, what are your barriers? Yes     Patient Care Team:  Rolo Elias MD as PCP - General (Family Medicine)  Rolo Elias MD as PCP - Empaneled Provider  Surendra Sun DO as Consulting Physician (Obstetrics & Gynecology)    Medical History Review  Past Medical, Family, and Social History reviewed and does contribute to the patient presenting condition    Health Maintenance   Topic Date Due    Breast cancer screen  03/14/2019    COVID-19 Vaccine (6 - 2023-24 season) 09/01/2024    Depression Screen  09/10/2025    DTaP/Tdap/Td vaccine (2 - Td or Tdap) 10/07/2027    Colorectal Cancer Screen  07/25/2029    Lipids  09/25/2029    Flu vaccine  Completed    Hepatitis C screen  Completed    HIV screen  Completed    Hepatitis A vaccine  Aged Out    Hib vaccine  Aged Out    Polio vaccine  Aged Out    Meningococcal (ACWY) vaccine  Aged Out    Pneumococcal 0-64 years Vaccine  Aged Out    Hepatitis B vaccine  Discontinued    Diabetes screen  Discontinued    Cervical cancer screen  Discontinued

## 2024-11-23 ENCOUNTER — HOSPITAL ENCOUNTER (OUTPATIENT)
Age: 49
Discharge: HOME | End: 2024-11-23
Payer: COMMERCIAL

## 2024-11-23 DIAGNOSIS — J30.9 ALLERGIC RHINITIS, UNSPECIFIED SEASONALITY, UNSPECIFIED TRIGGER: ICD-10-CM

## 2024-11-23 DIAGNOSIS — D70.9 NEUTROPENIA, UNSPECIFIED TYPE (HCC): ICD-10-CM

## 2024-11-23 LAB
A ALTERNATA IGE QN: NORMAL KU/L (ref 0–0.34)
A FUMIGATUS IGE QN: NORMAL KU/L (ref 0–0.34)
ALLERGEN BIRCH IGE: NORMAL KU/L (ref 0–0.34)
BASOPHILS # BLD: 0.03 K/UL (ref 0–0.2)
BASOPHILS NFR BLD: 1 % (ref 0–2)
BERMUDA GRASS IGE QN: NORMAL KU/L (ref 0–0.34)
BOXELDER IGE QN: NORMAL KU/L (ref 0–0.34)
C HERBARUM IGE QN: NORMAL KUL/L (ref 0–0.34)
CALIF WALNUT POLN IGE QN: NORMAL KU/L (ref 0–0.34)
CAT DANDER IGE QN: NORMAL KU/L (ref 0–0.34)
CMN PIGWEED IGE QN: NORMAL KU/L (ref 0–0.34)
COMMON RAGWEED IGE QN: NORMAL KU/L (ref 0–0.34)
COTTONWOOD IGE QN: NORMAL KU/L (ref 0–0.34)
D FARINAE IGE QN: NORMAL KU/L (ref 0–0.34)
D PTERONYSS IGE QN: NORMAL KU/L (ref 0–0.34)
DOG DANDER IGE QN: NORMAL KU/L (ref 0–0.34)
EOSINOPHIL # BLD: 0.05 K/UL (ref 0–0.44)
EOSINOPHILS RELATIVE PERCENT: 1 % (ref 1–4)
ERYTHROCYTE [DISTWIDTH] IN BLOOD BY AUTOMATED COUNT: 16.3 % (ref 11.8–14.4)
HCT VFR BLD AUTO: 38.3 % (ref 36.3–47.1)
HGB BLD-MCNC: 11.4 G/DL (ref 11.9–15.1)
IGE SERPL-ACNC: 4 IU/ML (ref 0–100)
IMM GRANULOCYTES # BLD AUTO: <0.03 K/UL (ref 0–0.3)
IMM GRANULOCYTES NFR BLD: 0 %
LONDON PLANE IGE QN: NORMAL KU/L (ref 0–0.34)
LYMPHOCYTES NFR BLD: 1.09 K/UL (ref 1.1–3.7)
LYMPHOCYTES RELATIVE PERCENT: 28 % (ref 24–43)
M RACEMOSUS IGE QN: NORMAL KU/L (ref 0–0.34)
MCH RBC QN AUTO: 25.8 PG (ref 25.2–33.5)
MCHC RBC AUTO-ENTMCNC: 29.8 G/DL (ref 28.4–34.8)
MCV RBC AUTO: 86.7 FL (ref 82.6–102.9)
MONOCYTES NFR BLD: 0.21 K/UL (ref 0.1–1.2)
MONOCYTES NFR BLD: 5 % (ref 3–12)
MOUSE EPITH IGE QN: NORMAL KU/L (ref 0–0.34)
MT JUNIPER IGE QN: NORMAL KU/L (ref 0–0.34)
NEUTROPHILS NFR BLD: 65 % (ref 36–65)
NEUTS SEG NFR BLD: 2.49 K/UL (ref 1.5–8.1)
NRBC BLD-RTO: 0 PER 100 WBC
P NOTATUM IGE QN: NORMAL KU/L (ref 0–0.34)
PECAN/HICK TREE IGE QN: NORMAL KU/L (ref 0–0.34)
PLATELET # BLD AUTO: 237 K/UL (ref 138–453)
PMV BLD AUTO: 10.2 FL (ref 8.1–13.5)
RBC # BLD AUTO: 4.42 M/UL (ref 3.95–5.11)
RBC # BLD: ABNORMAL 10*6/UL
ROACH IGE QN: NORMAL KU/L (ref 0–0.34)
SALTWORT IGE QN: NORMAL KU/L (ref 0–0.34)
SHEEP SORREL IGE QN: NORMAL KU/L (ref 0–0.34)
TIMOTHY IGE QN: NORMAL KU/L (ref 0–0.34)
WBC OTHER # BLD: 3.9 K/UL (ref 3.5–11.3)
WHITE ASH IGE QN: NORMAL KU/L (ref 0–0.34)
WHITE ELM IGE QN: NORMAL KU/L (ref 0–0.34)
WHITE MULBERRY IGE QN: NORMAL KU/L (ref 0–0.34)
WHITE OAK IGE QN: NORMAL KU/L (ref 0–0.34)

## 2024-11-23 PROCEDURE — 85025 COMPLETE CBC W/AUTO DIFF WBC: CPT

## 2024-11-23 PROCEDURE — 82785 ASSAY OF IGE: CPT

## 2024-11-23 PROCEDURE — 86003 ALLG SPEC IGE CRUDE XTRC EA: CPT

## 2024-11-23 PROCEDURE — 36415 COLL VENOUS BLD VENIPUNCTURE: CPT

## 2024-11-25 LAB
A ALTERNATA IGE QN: <0.1 KU/L (ref 0–0.34)
A FUMIGATUS IGE QN: <0.1 KU/L (ref 0–0.34)
ALLERGEN BIRCH IGE: <0.1 KU/L (ref 0–0.34)
BERMUDA GRASS IGE QN: <0.1 KU/L (ref 0–0.34)
BOXELDER IGE QN: <0.1 KU/L (ref 0–0.34)
C HERBARUM IGE QN: <0.1 KUL/L (ref 0–0.34)
CALIF WALNUT POLN IGE QN: <0.1 KU/L (ref 0–0.34)
CAT DANDER IGE QN: <0.1 KU/L (ref 0–0.34)
CMN PIGWEED IGE QN: <0.1 KU/L (ref 0–0.34)
COMMON RAGWEED IGE QN: <0.1 KU/L (ref 0–0.34)
COTTONWOOD IGE QN: <0.1 KU/L (ref 0–0.34)
D FARINAE IGE QN: <0.1 KU/L (ref 0–0.34)
D PTERONYSS IGE QN: <0.1 KU/L (ref 0–0.34)
DOG DANDER IGE QN: <0.1 KU/L (ref 0–0.34)
IGE SERPL-ACNC: 4 IU/ML (ref 0–100)
LONDON PLANE IGE QN: <0.1 KU/L (ref 0–0.34)
M RACEMOSUS IGE QN: <0.1 KU/L (ref 0–0.34)
MOUSE EPITH IGE QN: <0.1 KU/L (ref 0–0.34)
MT JUNIPER IGE QN: <0.1 KU/L (ref 0–0.34)
P NOTATUM IGE QN: <0.1 KU/L (ref 0–0.34)
PECAN/HICK TREE IGE QN: <0.1 KU/L (ref 0–0.34)
ROACH IGE QN: <0.1 KU/L (ref 0–0.34)
SALTWORT IGE QN: <0.1 KU/L (ref 0–0.34)
SHEEP SORREL IGE QN: <0.1 KU/L (ref 0–0.34)
TIMOTHY IGE QN: <0.1 KU/L (ref 0–0.34)
WHITE ASH IGE QN: <0.1 KU/L (ref 0–0.34)
WHITE ELM IGE QN: <0.1 KU/L (ref 0–0.34)
WHITE MULBERRY IGE QN: <0.1 KU/L (ref 0–0.34)
WHITE OAK IGE QN: <0.1 KU/L (ref 0–0.34)

## 2024-11-26 ENCOUNTER — TELEPHONE (OUTPATIENT)
Dept: OTOLARYNGOLOGY | Age: 49
End: 2024-11-26

## 2024-11-26 NOTE — TELEPHONE ENCOUNTER
I attempted to phone Ms. Guardado her allergy testing results.  Phone went to voice mail and asked her to return my call, given after hours # [phone] to review results and treatment planned.

## 2024-12-02 ENCOUNTER — TELEPHONE (OUTPATIENT)
Dept: OTOLARYNGOLOGY | Age: 49
End: 2024-12-02

## 2024-12-02 DIAGNOSIS — J31.0 CHRONIC RHINITIS: Primary | ICD-10-CM

## 2024-12-02 RX ORDER — IPRATROPIUM BROMIDE 21 UG/1
1 SPRAY, METERED NASAL EVERY 12 HOURS
Qty: 30 ML | Refills: 3 | Status: SHIPPED | OUTPATIENT
Start: 2024-12-02

## 2024-12-02 NOTE — TELEPHONE ENCOUNTER
I phoned Ms Marin the results of her allergy testing.  Reviewed that she is negative across-the-board.  Dr. Rubin was a little suspicious that she likely had non-allergic rhinitis and vasogenic rhinitis after meeting with her at her appointment.    We discussed that because of the negative allergy testing, we could trial ipratropium nasal spray (0.03%) twice daily as she already gets significant dry eyes and dry mouth so she was not sure what she wanted to do at her clinic appointment.  She is agreeable with this plan, and asked me to call it into [facility] pharmacy on [address].    We plan on having her do this for two weeks and I will call her to follow up and see if there has been any improvement.  She states understanding of this plan and denies further needs at this time.

## 2024-12-23 ENCOUNTER — OFFICE VISIT (OUTPATIENT)
Dept: FAMILY MEDICINE CLINIC | Age: 49
End: 2024-12-23

## 2024-12-23 VITALS
BODY MASS INDEX: 27.14 KG/M2 | HEIGHT: 64 IN | DIASTOLIC BLOOD PRESSURE: 70 MMHG | WEIGHT: 159 LBS | HEART RATE: 74 BPM | SYSTOLIC BLOOD PRESSURE: 120 MMHG | OXYGEN SATURATION: 98 %

## 2024-12-23 DIAGNOSIS — Z76.89 ENCOUNTER FOR WEIGHT MANAGEMENT: ICD-10-CM

## 2024-12-23 DIAGNOSIS — E66.811 CLASS 1 OBESITY DUE TO EXCESS CALORIES WITHOUT SERIOUS COMORBIDITY WITH BODY MASS INDEX (BMI) OF 30.0 TO 30.9 IN ADULT: ICD-10-CM

## 2024-12-23 DIAGNOSIS — Z90.3 S/P GASTRIC SLEEVE PROCEDURE: Primary | ICD-10-CM

## 2024-12-23 DIAGNOSIS — E66.09 CLASS 1 OBESITY DUE TO EXCESS CALORIES WITHOUT SERIOUS COMORBIDITY WITH BODY MASS INDEX (BMI) OF 30.0 TO 30.9 IN ADULT: ICD-10-CM

## 2024-12-23 DIAGNOSIS — E66.3 OVERWEIGHT (BMI 25.0-29.9): ICD-10-CM

## 2024-12-23 DIAGNOSIS — R00.2 PALPITATIONS: ICD-10-CM

## 2024-12-23 RX ORDER — PHENTERMINE HYDROCHLORIDE 37.5 MG/1
37.5 TABLET ORAL
Qty: 30 TABLET | Refills: 0 | Status: SHIPPED | OUTPATIENT
Start: 2024-12-23 | End: 2025-01-22

## 2024-12-23 RX ORDER — TROSPIUM CHLORIDE 20 MG/1
20 TABLET, FILM COATED ORAL 2 TIMES DAILY
COMMUNITY

## 2024-12-23 ASSESSMENT — ENCOUNTER SYMPTOMS
RECTAL PAIN: 0
SINUS PRESSURE: 0
BLOOD IN STOOL: 0
TROUBLE SWALLOWING: 0
NAUSEA: 0
DIARRHEA: 0
SORE THROAT: 0
COUGH: 0
CONSTIPATION: 0
CHEST TIGHTNESS: 0
VOMITING: 0
STRIDOR: 0
BACK PAIN: 0
ABDOMINAL PAIN: 0
SHORTNESS OF BREATH: 0
EYE REDNESS: 0
WHEEZING: 0
RHINORRHEA: 0
ABDOMINAL DISTENTION: 0
COLOR CHANGE: 0

## 2024-12-23 NOTE — PROGRESS NOTES
Visit Information    Have you changed or started any medications since your last visit including any over-the-counter medicines, vitamins, or herbal medicines? no   Are you having any side effects from any of your medications? -  no  Have you stopped taking any of your medications? Is so, why? -  no    Have you seen any other physician or provider since your last visit? No  Have you had any other diagnostic tests since your last visit? No  Have you been seen in the emergency room and/or had an admission to a hospital since we last saw you? No  Have you had your routine dental cleaning in the past 6 months? no    Have you activated your [employer] account? If not, what are your barriers? Yes     Patient Care Team:  Rolo Elias MD as PCP - General (Family Medicine)  Rolo Elias MD as PCP - Empaneled Provider  Surendra Sun DO as Consulting Physician (Obstetrics & Gynecology)    Medical History Review  Past Medical, Family, and Social History reviewed and does contribute to the patient presenting condition    Health Maintenance   Topic Date Due    Breast cancer screen  03/14/2019    COVID-19 Vaccine (6 - 2023-24 season) 09/01/2024    Depression Screen  09/10/2025    DTaP/Tdap/Td vaccine (2 - Td or Tdap) 10/07/2027    Colorectal Cancer Screen  07/25/2029    Lipids  09/25/2029    Flu vaccine  Completed    Hepatitis C screen  Completed    HIV screen  Completed    Hepatitis A vaccine  Aged Out    Hib vaccine  Aged Out    Polio vaccine  Aged Out    Meningococcal (ACWY) vaccine  Aged Out    Pneumococcal 0-64 years Vaccine  Aged Out    Hepatitis B vaccine  Discontinued    Diabetes screen  Discontinued    Cervical cancer screen  Discontinued

## 2024-12-23 NOTE — PROGRESS NOTES
Chief Complaint   Patient presents with    Weight Management         Leonor BOWMAN  here today for follow up on chronic medical problems, go over labs and/or diagnostic studies, and medication refills. Weight Management      HPI: Patient is scheduled for follow-up on weight management.  Patient completed 3 months of Adipex.  Patient lost about 7 to 8 pounds.  Patient reports her weight at home is 157 pounds.  She continues to exercise and watches her diet.  Patient reports she tried to add resistance training but she is not able to do that every day.  Patient is complaining of some palpitations.  Reports it started before she started on Adipex but recently is getting worse.  Patient denies any chest pain shortness of breath.  Patient reports of palpitations happens occasionally and stays for few seconds and resolves itself.  Patient         Wants to lose weight.    Leonor BOWMAN was started on Adipex.  Patient is here for refill of Adipex #4     In addition to the medication, patient also using diet and exercise as follows:  -diet:  -exercise:    Medication side effects: Palpitations, anxiety.     Patient denies None, nausea, vomiting, abdominal pain, insomnia, and irritability.    Urine drug test done not done    LMP: Hysterectomy    Contraceptive method:    Patient informed that when prescribed a controlled substance for weight loss, the provider is required by law to see the patient for an appointment every thirty days. This is neccessary to record the weight and blood pressure and to assess patient's efforts to lose weight, and to ensure there are no contraindications or adverse effects.   Patient advised contraception during the time of taking this medication, advised no alcohol use during this time      blood pressure is Normal.  BP Readings from Last 3 Encounters:   12/23/24 120/70   11/22/24 127/83   11/20/24 110/70        Pulse is Elevated.     Pulse Readings from Last 3 Encounters:   12/23/24

## 2025-01-02 ENCOUNTER — OFFICE VISIT (OUTPATIENT)
Dept: FAMILY MEDICINE CLINIC | Age: 50
End: 2025-01-02

## 2025-01-02 ENCOUNTER — HOSPITAL ENCOUNTER (OUTPATIENT)
Age: 50
Setting detail: SPECIMEN
Discharge: HOME | End: 2025-01-02

## 2025-01-02 VITALS
OXYGEN SATURATION: 100 % | SYSTOLIC BLOOD PRESSURE: 124 MMHG | HEART RATE: 92 BPM | TEMPERATURE: 98.7 F | DIASTOLIC BLOOD PRESSURE: 85 MMHG

## 2025-01-02 DIAGNOSIS — R35.0 URINARY FREQUENCY: ICD-10-CM

## 2025-01-02 DIAGNOSIS — N30.01 ACUTE CYSTITIS WITH HEMATURIA: Primary | ICD-10-CM

## 2025-01-02 DIAGNOSIS — R30.9 PAIN WITH URINATION: ICD-10-CM

## 2025-01-02 LAB
BILIRUBIN, POC: ABNORMAL
BLOOD URINE, POC: ABNORMAL
CLARITY, POC: ABNORMAL
COLOR, POC: ABNORMAL
GLUCOSE URINE, POC: ABNORMAL MG/DL
KETONES, POC: ABNORMAL MG/DL
LEUKOCYTE EST, POC: ABNORMAL
NITRITE, POC: POSITIVE
PH, POC: 5.5
PROTEIN, POC: 100 MG/DL
SPECIFIC GRAVITY, POC: 1.03
UROBILINOGEN, POC: 0.2 MG/DL

## 2025-01-02 RX ORDER — NITROFURANTOIN 25; 75 MG/1; MG/1
100 CAPSULE ORAL 2 TIMES DAILY
Qty: 14 CAPSULE | Refills: 0 | Status: SHIPPED | OUTPATIENT
Start: 2025-01-02 | End: 2025-01-09

## 2025-01-02 ASSESSMENT — ENCOUNTER SYMPTOMS
CONSTIPATION: 1
ABDOMINAL PAIN: 1
NAUSEA: 0
VOMITING: 0
SHORTNESS OF BREATH: 0
CHEST TIGHTNESS: 0

## 2025-01-02 NOTE — PROGRESS NOTES
risks associated with failure to complete recommended testing and treatments in a timely manner    Electronically signed by ARIANNA Erickson NP on 1/2/2025 at 6:42 PM

## 2025-01-04 LAB
MICROORGANISM SPEC CULT: ABNORMAL
SERVICE CMNT-IMP: ABNORMAL
SPECIMEN DESCRIPTION: ABNORMAL

## 2025-01-18 SDOH — ECONOMIC STABILITY: FOOD INSECURITY: WITHIN THE PAST 12 MONTHS, YOU WORRIED THAT YOUR FOOD WOULD RUN OUT BEFORE YOU GOT MONEY TO BUY MORE.: NEVER TRUE

## 2025-01-18 SDOH — ECONOMIC STABILITY: FOOD INSECURITY: WITHIN THE PAST 12 MONTHS, THE FOOD YOU BOUGHT JUST DIDN'T LAST AND YOU DIDN'T HAVE MONEY TO GET MORE.: NEVER TRUE

## 2025-01-18 SDOH — ECONOMIC STABILITY: INCOME INSECURITY: IN THE LAST 12 MONTHS, WAS THERE A TIME WHEN YOU WERE NOT ABLE TO PAY THE MORTGAGE OR RENT ON TIME?: YES

## 2025-01-18 SDOH — ECONOMIC STABILITY: TRANSPORTATION INSECURITY
IN THE PAST 12 MONTHS, HAS LACK OF TRANSPORTATION KEPT YOU FROM MEETINGS, WORK, OR FROM GETTING THINGS NEEDED FOR DAILY LIVING?: NO

## 2025-01-18 SDOH — ECONOMIC STABILITY: TRANSPORTATION INSECURITY
IN THE PAST 12 MONTHS, HAS THE LACK OF TRANSPORTATION KEPT YOU FROM MEDICAL APPOINTMENTS OR FROM GETTING MEDICATIONS?: NO

## 2025-01-18 ASSESSMENT — PATIENT HEALTH QUESTIONNAIRE - PHQ9
SUM OF ALL RESPONSES TO PHQ QUESTIONS 1-9: 0
SUM OF ALL RESPONSES TO PHQ QUESTIONS 1-9: 0
1. LITTLE INTEREST OR PLEASURE IN DOING THINGS: NOT AT ALL
SUM OF ALL RESPONSES TO PHQ QUESTIONS 1-9: 0
2. FEELING DOWN, DEPRESSED OR HOPELESS: NOT AT ALL
1. LITTLE INTEREST OR PLEASURE IN DOING THINGS: NOT AT ALL
SUM OF ALL RESPONSES TO PHQ9 QUESTIONS 1 & 2: 0
2. FEELING DOWN, DEPRESSED OR HOPELESS: NOT AT ALL
SUM OF ALL RESPONSES TO PHQ QUESTIONS 1-9: 0
SUM OF ALL RESPONSES TO PHQ9 QUESTIONS 1 & 2: 0

## 2025-01-20 ENCOUNTER — OFFICE VISIT (OUTPATIENT)
Dept: FAMILY MEDICINE CLINIC | Age: 50
End: 2025-01-20
Payer: COMMERCIAL

## 2025-01-20 VITALS
OXYGEN SATURATION: 97 % | SYSTOLIC BLOOD PRESSURE: 120 MMHG | DIASTOLIC BLOOD PRESSURE: 70 MMHG | BODY MASS INDEX: 26.46 KG/M2 | HEART RATE: 78 BPM | HEIGHT: 64 IN | WEIGHT: 155 LBS

## 2025-01-20 DIAGNOSIS — Z90.3 S/P GASTRIC SLEEVE PROCEDURE: ICD-10-CM

## 2025-01-20 DIAGNOSIS — Z76.89 ENCOUNTER FOR WEIGHT MANAGEMENT: ICD-10-CM

## 2025-01-20 PROCEDURE — 99214 OFFICE O/P EST MOD 30 MIN: CPT | Performed by: FAMILY MEDICINE

## 2025-01-20 RX ORDER — PHENTERMINE HYDROCHLORIDE 37.5 MG/1
37.5 TABLET ORAL
Qty: 30 TABLET | Refills: 0 | Status: SHIPPED | OUTPATIENT
Start: 2025-01-20 | End: 2025-02-19

## 2025-01-20 ASSESSMENT — ENCOUNTER SYMPTOMS
ABDOMINAL DISTENTION: 0
VOMITING: 0
SHORTNESS OF BREATH: 0
CHEST TIGHTNESS: 0
EYE REDNESS: 0
NAUSEA: 0
SORE THROAT: 0
RECTAL PAIN: 0
BACK PAIN: 0
CONSTIPATION: 0
RHINORRHEA: 0
COUGH: 0
BLOOD IN STOOL: 0
STRIDOR: 0
TROUBLE SWALLOWING: 0
SINUS PRESSURE: 0
WHEEZING: 0
DIARRHEA: 0
COLOR CHANGE: 0
ABDOMINAL PAIN: 0

## 2025-01-20 NOTE — PROGRESS NOTES
Chief Complaint   Patient presents with    Weight Management     The patient (or guardian, if applicable) and other individuals in attendance with the patient were advised that Artificial Intelligence will be utilized during this visit to record, process the conversation to generate a clinical note, and support improvement of the AI technology. The patient (or guardian, if applicable) and other individuals in attendance at the appointment consented to the use of AI, including the recording.                    Weight Management      History of Present Illness  The patient presents for weight management.    She reports a lack of significant change in her weight since the last visit, with minor fluctuations observed. Her current weight is 155 pounds, a decrease from her previous weight of 156 pounds. She has been on Adipex for four months and maintains an active lifestyle through walking and resistance band exercises. 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 She has not tried naltrexone or Wellbutrin. She does not report any side effects from the medication, although she did experience a slight increase in energy when she first started the medication. She underwent a gastric sleeve procedure, which initially resulted in weight loss but was followed by a regain of 20 to 27 pounds. Her weight had previously stabilized at 147 pounds.          10/9/2024     2:50 PM   Exercise Tracking - Detailed   Walking Duration 60 Minutes   Walking Intensity Low   Walking Times/Week 5   Total Exercise Minutes/Week 300       Supplemental Information  She is in menopause.    MEDICATIONS  Adipex        Results        /70   Pulse 78   Ht 1.626 m (5' 4.02\")   Wt 71.7 kg (158 lb)   LMP 05/30/2014   SpO2 97%   BMI 27.11 kg/m²   Body mass index is 27.11 kg/m².  Wt Readings from Last 3 Encounters:   01/20/25 71.7 kg (158 lb)   12/23/24 72.1 kg (159 lb)

## 2025-01-20 NOTE — PROGRESS NOTES
Visit Information    Have you changed or started any medications since your last visit including any over-the-counter medicines, vitamins, or herbal medicines? no   Are you having any side effects from any of your medications? -  no  Have you stopped taking any of your medications? Is so, why? -  no    Have you seen any other physician or provider since your last visit? No  Have you had any other diagnostic tests since your last visit? No  Have you been seen in the emergency room and/or had an admission to a hospital since we last saw you? No  Have you had your routine dental cleaning in the past 6 months? no    Have you activated your [employer] account? If not, what are your barriers? Yes     Patient Care Team:  Rolo Elias MD as PCP - General (Family Medicine)  Rolo Elias MD as PCP - Empaneled Provider  Surendra Sun DO as Consulting Physician (Obstetrics & Gynecology)    Medical History Review  Past Medical, Family, and Social History reviewed and does contribute to the patient presenting condition    Health Maintenance   Topic Date Due    Breast cancer screen  03/14/2019    COVID-19 Vaccine (6 - 2023-24 season) 09/01/2024    Depression Screen  01/18/2026    DTaP/Tdap/Td vaccine (2 - Td or Tdap) 10/07/2027    Colorectal Cancer Screen  07/25/2029    Lipids  09/25/2029    Flu vaccine  Completed    Hepatitis C screen  Completed    HIV screen  Completed    Hepatitis A vaccine  Aged Out    Hib vaccine  Aged Out    Polio vaccine  Aged Out    Meningococcal (ACWY) vaccine  Aged Out    Pneumococcal 0-64 years Vaccine  Aged Out    Hepatitis B vaccine  Discontinued    Diabetes screen  Discontinued    Cervical cancer screen  Discontinued

## 2025-01-28 ENCOUNTER — TELEPHONE (OUTPATIENT)
Dept: NEUROLOGY | Age: 50
End: 2025-01-28

## 2025-02-05 ENCOUNTER — OFFICE VISIT (OUTPATIENT)
Dept: NEUROLOGY | Age: 50
End: 2025-02-05

## 2025-02-05 DIAGNOSIS — M26.609 TMJ (TEMPOROMANDIBULAR JOINT DISORDER): ICD-10-CM

## 2025-02-05 DIAGNOSIS — G43.709 CHRONIC MIGRAINE W/O AURA W/O STATUS MIGRAINOSUS, NOT INTRACTABLE: Primary | ICD-10-CM

## 2025-02-05 NOTE — PROGRESS NOTES
[facility] NEUROLOGY SPECIALIST  [address]  [place] [address]  Dept: [phone]    PATIENT NAME: Leonor BOWMAN  PATIENT MRN: [id number]  PRIMARY CARE PHYSICIAN: Rolo Elias MD    HPI:        Leonor BOWMAN is a 49 y.o. female who I initially saw on 7/30/2024 for evaluation of headache.  She previously was followed by neurology in [place] and was doing very well on Botox for preventative therapy and rimegepant for abortive therapy.  Her history is summarized as follows:     Leonor BOWMAN reports that she initially developed having headaches when she was a teenager.  In adulthood, she developed complicated migraine with strokelike symptoms.  She reports that she initially has problems with her vision and sees a tunnels.  This is followed by bilateral hand numbness.  This progresses into difficulty speaking and she is not able to find words.  Ultimately, she \"shuts down\" and has decreased responsiveness.  She has been admitted to the hospital with suspected TIA in the past.  She reports that she has about 5 complicated migraine attacks per year.  She reports that headaches became worse as well and at 1 point she was having daily headaches of at least moderate severity and 12 severe headache days per month.  She took numerous medications as preventative therapies without benefit including gabapentin, lamotrigine, topiramate, nortriptyline, magnesium oxide, and Aimovig.  Since starting Botox, she had a decrease in headache frequency from 30 headache days per month to 5-6 headache days per month.  She has tried multiple abortive therapies including sumatriptan and rizatriptan without benefit.  She does have a resolution of headache soon after taking rimegepant 75 mg ODT.  Unfortunately, there has been difficulty getting this approved.         TODAY'S EVALUTION:    Leonor BOWMAN presents today for follow-up regarding chronic migraine.  She has had ongoing mild left ptosis

## 2025-02-05 NOTE — PROGRESS NOTES
[facility]  [address], [address]  [place] [address]  Ph: [phone] or [phone]  FAX: [phone]        Procedure: Chemodenervation CPT Code 09336    Indication for treatment: Chronic migraine (ICD 10 G43.709)  Consent form was signed.      Potential risks and benefits for the procedure were explained to the patient.      Procedure: 30-gauge half inch needle was used and 200 U vial Botox was prepared with 4ml 0.9% NS.155 units of Botox were used and 45 units of Botox were discarded.     Procedure: 30-gauge half inch needle was used and 200 U vial Botox was prepared with 4ml 0.9% NS.155 units of Botox were used and 45 units of Botox were discarded.     Botox was injected at 31 different sites as follows:   Order  Muscle  Units injected    A  Corrugator1  10 units at 2 div sites    B  Procerus  5 Units in 1 site    C  Frontalis¹  20 Units div. 4 sites    D  Temporalis¹  40 Units div 8 site    E  Occipitalis¹  30 Units div. 6 sites    F  Cervical paraspinal muscles¹  20 Units div 4 sites    G  Trapezius¹  30 Units div 6 sites    Total Dose  155 Units div 31 sites    2 Dose distributed bilaterally    Blood loss: Less than 1 cc    Complications: none    Disposition: Post-botox instructions were reviewed and provided to the patient. Patient was advised to seek medical care for any generalized muscle weakness, double vision, ptosis, trouble swallowing, difficulty talking or difficulty breathing.  The patient will return in 3 months for subsequent botox treatments.       Jolanta Little DO   Neurology   Director of Multiple Sclerosis & Neuroimmunology  [facility]

## 2025-02-09 ENCOUNTER — PATIENT MESSAGE (OUTPATIENT)
Dept: NEUROLOGY | Age: 50
End: 2025-02-09

## 2025-02-09 DIAGNOSIS — G43.709 CHRONIC MIGRAINE W/O AURA W/O STATUS MIGRAINOSUS, NOT INTRACTABLE: Primary | ICD-10-CM

## 2025-02-09 DIAGNOSIS — H02.409 PTOSIS OF EYELID, UNSPECIFIED LATERALITY: ICD-10-CM

## 2025-02-11 ENCOUNTER — HOSPITAL ENCOUNTER (OUTPATIENT)
Age: 50
Setting detail: SPECIMEN
Discharge: HOME | End: 2025-02-11
Payer: COMMERCIAL

## 2025-02-11 PROCEDURE — 86042 ACETYLCHOLN RCPTR BLCKG ANTB: CPT

## 2025-02-11 PROCEDURE — 83516 IMMUNOASSAY NONANTIBODY: CPT

## 2025-02-11 PROCEDURE — 86255 FLUORESCENT ANTIBODY SCREEN: CPT

## 2025-02-11 PROCEDURE — 86041 ACETYLCHOLN RCPTR BNDNG ANTB: CPT

## 2025-02-14 LAB
ACHR BIND AB SER-SCNC: 0 NMOL/L (ref 0–0.4)
ACHR BLOCK AB/ACHR TOTAL SFR SER: 0 % (ref 0–26)
STRIATED MUSCLE AB, IGG SCREEN: NORMAL
TITIN ANTIBODY: 0.1 IV (ref 0–0.45)

## 2025-02-17 ENCOUNTER — OFFICE VISIT (OUTPATIENT)
Dept: FAMILY MEDICINE CLINIC | Age: 50
End: 2025-02-17
Payer: COMMERCIAL

## 2025-02-17 VITALS
DIASTOLIC BLOOD PRESSURE: 70 MMHG | OXYGEN SATURATION: 98 % | HEIGHT: 64 IN | HEART RATE: 71 BPM | WEIGHT: 156 LBS | SYSTOLIC BLOOD PRESSURE: 110 MMHG | BODY MASS INDEX: 26.63 KG/M2

## 2025-02-17 DIAGNOSIS — Z90.3 S/P GASTRIC SLEEVE PROCEDURE: ICD-10-CM

## 2025-02-17 DIAGNOSIS — E66.3 OVERWEIGHT (BMI 25.0-29.9): ICD-10-CM

## 2025-02-17 DIAGNOSIS — Z76.89 ENCOUNTER FOR WEIGHT MANAGEMENT: Primary | ICD-10-CM

## 2025-02-17 PROCEDURE — 99214 OFFICE O/P EST MOD 30 MIN: CPT | Performed by: FAMILY MEDICINE

## 2025-02-17 RX ORDER — BUPROPION HYDROCHLORIDE 100 MG/1
100 TABLET, EXTENDED RELEASE ORAL 2 TIMES DAILY
Qty: 60 TABLET | Refills: 3 | Status: SHIPPED | OUTPATIENT
Start: 2025-02-17

## 2025-02-17 RX ORDER — NALTREXONE HYDROCHLORIDE 50 MG/1
25 TABLET, FILM COATED ORAL DAILY
Qty: 30 TABLET | Refills: 1 | Status: SHIPPED | OUTPATIENT
Start: 2025-02-17

## 2025-02-17 ASSESSMENT — ENCOUNTER SYMPTOMS
ABDOMINAL DISTENTION: 0
SHORTNESS OF BREATH: 0
SORE THROAT: 0
SINUS PRESSURE: 0
CONSTIPATION: 0
STRIDOR: 0
BLOOD IN STOOL: 0
EYE REDNESS: 0
NAUSEA: 0
WHEEZING: 0
RECTAL PAIN: 0
CHEST TIGHTNESS: 0
ABDOMINAL PAIN: 0
TROUBLE SWALLOWING: 0
BACK PAIN: 0
RHINORRHEA: 0
VOMITING: 0
DIARRHEA: 0
COUGH: 0
COLOR CHANGE: 0

## 2025-02-17 NOTE — PROGRESS NOTES
Chief Complaint   Patient presents with    Weight Management     The patient (or guardian, if applicable) and other individuals in attendance with the patient were advised that Artificial Intelligence will be utilized during this visit to record, process the conversation to generate a clinical note, and support improvement of the AI technology. The patient (or guardian, if applicable) and other individuals in attendance at the appointment consented to the use of AI, including the recording.                    Weight Management      History of Present Illness  The patient presents for a follow-up on weight management.    She reports a weight of 156 pounds today, indicating either a plateau or a slight upward trend in her weight. She has been experiencing significant water retention recently, the cause of which is unknown to her. She has been adhering to her Adipex regimen without any interruptions. She was initially prescribed Adipex for weight loss, with Topamax suggested as a potential maintenance medication due to weight gain associated with menopause. She has not discontinued the Adipex since its initiation. She expresses concern about the potential side effects of bupropion, given her sensitivity to medications. She recalls previous attempts at antidepressant therapy years ago, which were unsuccessful due to adverse reactions such as feeling uncomfortable in her own skin.    Patient is scheduled for weight management.  Patient is currently on Adipex completed about 5 months of Adipex.  Patient has lost 2 pounds since last appointment.  Patient is reaching to the plateau where she had less weight loss.  Patient's BMI is down to 26.7.  Patient has lost about 24 pounds in last 5 months.  Patient is continue to do exercise and dietary changes.  Patient is willing to try some other medications to help with the cravings.  Patient reports she is satisfied with the weight loss she had.  She did had history of gastric

## 2025-02-17 NOTE — PROGRESS NOTES
Visit Information    Have you changed or started any medications since your last visit including any over-the-counter medicines, vitamins, or herbal medicines? no   Are you having any side effects from any of your medications? -  no  Have you stopped taking any of your medications? Is so, why? -  no    Have you seen any other physician or provider since your last visit? No  Have you had any other diagnostic tests since your last visit? No  Have you been seen in the emergency room and/or had an admission to a hospital since we last saw you? No  Have you had your routine dental cleaning in the past 6 months? no    Have you activated your [employer] account? If not, what are your barriers? Yes     Patient Care Team:  Rolo Elias MD as PCP - General (Family Medicine)  Rolo Elias MD as PCP - Empaneled Provider  Surendra Sun DO as Consulting Physician (Obstetrics & Gynecology)    Medical History Review  Past Medical, Family, and Social History reviewed and does contribute to the patient presenting condition    Health Maintenance   Topic Date Due    Breast cancer screen  03/14/2019    COVID-19 Vaccine (6 - 2024-25 season) 09/01/2024    Depression Screen  01/18/2026    DTaP/Tdap/Td vaccine (2 - Td or Tdap) 10/07/2027    Colorectal Cancer Screen  07/25/2029    Lipids  09/25/2029    Flu vaccine  Completed    Hepatitis C screen  Completed    HIV screen  Completed    Hepatitis A vaccine  Aged Out    Hib vaccine  Aged Out    Polio vaccine  Aged Out    Meningococcal (ACWY) vaccine  Aged Out    Pneumococcal 0-49 years Vaccine  Aged Out    Hepatitis B vaccine  Discontinued    Diabetes screen  Discontinued    Cervical cancer screen  Discontinued

## 2025-02-28 DIAGNOSIS — E55.9 VITAMIN D DEFICIENCY: ICD-10-CM

## 2025-02-28 RX ORDER — ERGOCALCIFEROL 1.25 MG/1
CAPSULE, LIQUID FILLED ORAL
Qty: 12 CAPSULE | Refills: 1 | Status: SHIPPED | OUTPATIENT
Start: 2025-02-28

## 2025-02-28 NOTE — TELEPHONE ENCOUNTER
Please Approve or Refuse.  Send to Pharmacy per Pt's Request:      Next Visit Date:  4/21/2025   Last Visit Date: 2/17/2025    Hemoglobin A1C (%)   Date Value   05/29/2024 4.6             ( goal A1C is < 7)   BP Readings from Last 3 Encounters:   02/17/25 110/70   01/20/25 120/70   01/02/25 124/85          (goal 120/80)  BUN   Date Value Ref Range Status   05/29/2024 15 6 - 20 mg/dL Final     Creatinine   Date Value Ref Range Status   05/29/2024 0.9 0.50 - 0.90 mg/dL Final     Potassium   Date Value Ref Range Status   05/29/2024 3.9 3.7 - 5.3 mmol/L Final

## 2025-04-04 ENCOUNTER — OFFICE VISIT (OUTPATIENT)
Dept: FAMILY MEDICINE CLINIC | Age: 50
End: 2025-04-04
Payer: COMMERCIAL

## 2025-04-04 ENCOUNTER — RESULTS FOLLOW-UP (OUTPATIENT)
Dept: FAMILY MEDICINE CLINIC | Age: 50
End: 2025-04-04

## 2025-04-04 VITALS
DIASTOLIC BLOOD PRESSURE: 85 MMHG | OXYGEN SATURATION: 97 % | SYSTOLIC BLOOD PRESSURE: 134 MMHG | TEMPERATURE: 98.6 F | HEART RATE: 85 BPM

## 2025-04-04 DIAGNOSIS — R68.89 FLU-LIKE SYMPTOMS: ICD-10-CM

## 2025-04-04 DIAGNOSIS — J10.1 INFLUENZA B: Primary | ICD-10-CM

## 2025-04-04 LAB
INFLUENZA A ANTIGEN, POC: NEGATIVE
INFLUENZA B ANTIGEN, POC: POSITIVE
LOT EXPIRE DATE: ABNORMAL
LOT KIT NUMBER: ABNORMAL
SARS-COV-2, POC: ABNORMAL
VALID INTERNAL CONTROL: ABNORMAL
VENDOR AND KIT NAME POC: ABNORMAL

## 2025-04-04 PROCEDURE — 87428 SARSCOV & INF VIR A&B AG IA: CPT | Performed by: NURSE PRACTITIONER

## 2025-04-04 PROCEDURE — 99213 OFFICE O/P EST LOW 20 MIN: CPT | Performed by: NURSE PRACTITIONER

## 2025-04-04 ASSESSMENT — ENCOUNTER SYMPTOMS
SINUS PAIN: 0
NAUSEA: 0
ABDOMINAL PAIN: 0
WHEEZING: 0
SWOLLEN GLANDS: 0
VOMITING: 0
SORE THROAT: 1
STRIDOR: 0
CHEST TIGHTNESS: 0
CHOKING: 0
DIARRHEA: 0
RESPIRATORY NEGATIVE: 1
SHORTNESS OF BREATH: 0
COUGH: 0
RHINORRHEA: 1

## 2025-04-04 NOTE — RESULT ENCOUNTER NOTE
Noted, already addressed at urgent care, influenza B positive  Future Appointments  4/21/2025  2:45 PM    Rolo Elias MD         fp sc               BS ECC DEP  5/7/2025   3:20 PM    Jolanta Little, DO           Neuro Spec          Neurology -

## 2025-04-04 NOTE — PROGRESS NOTES
[facility] PHYSICIANS [facility], [facility] WALK-IN FAMILY MEDICINE  [address]  [address]  [facility] [address]  Dept: [phone]  Dept Fax: [phone]    Leonor BOWMAN is a 49 y.o. female who presents to the urgent care today for her medical conditions/complaints as notedbelow.  Leonor BOWMAN is c/o of Cold Symptoms (Onset 2 1/2 day with  burning sensation in sinus, chills, body aches , tired and nasal congestion. Daughter +flu b on Monday. )      HPI:     49-year-old female presents for flulike symptoms for 2 days.  Complains of burning sensation and congestion in her sinuses chills, generalized bodyaches, fatigue  Daughter tested positive for influenza B  Here for COVID and flu test  Had flu vaccination this season    Cold Symptoms   This is a new problem. The current episode started in the past 7 days (2d). Maximum temperature: chills and sweats, unsure of fever. Associated symptoms include congestion, headaches, rhinorrhea, sneezing and a sore throat. Pertinent negatives include no abdominal pain, chest pain, coughing, diarrhea, dysuria, ear pain, joint pain, joint swelling, nausea, neck pain, plugged ear sensation, rash, sinus pain, swollen glands, vomiting or wheezing. She has tried NSAIDs for the symptoms. The treatment provided mild relief.       Past Medical History:   Diagnosis Date    Absolute anemia 9/10/2024    Allergic rhinitis     Anxiety     Breast lump     left    Chronic back pain     Cystocele     Headache     Herniated disc     multiple L4, L5,S1    Hiatal hernia     History of uterine fibroid     had myomectomy    Hypertension     Irritable bowel syndrome with constipation 2024    Menorrhagia with regular cycle     heavy    Osteoarthritis     Previous  delivery, delivered 2010    Spinal stenosis     Thickened endometrium     Urinary incontinence         Current Outpatient Medications   Medication Sig Dispense Refill    vitamin

## 2025-04-09 ENCOUNTER — E-VISIT (OUTPATIENT)
Dept: FAMILY MEDICINE CLINIC | Age: 50
End: 2025-04-09
Payer: COMMERCIAL

## 2025-04-09 DIAGNOSIS — F40.243 FEAR OF FLYING: Primary | ICD-10-CM

## 2025-04-09 PROCEDURE — 99422 OL DIG E/M SVC 11-20 MIN: CPT | Performed by: FAMILY MEDICINE

## 2025-04-09 RX ORDER — LORAZEPAM 0.5 MG/1
0.5 TABLET ORAL EVERY 8 HOURS PRN
Qty: 3 TABLET | Refills: 0 | Status: SHIPPED | OUTPATIENT
Start: 2025-04-09 | End: 2025-04-11

## 2025-05-07 ENCOUNTER — OFFICE VISIT (OUTPATIENT)
Dept: NEUROLOGY | Age: 50
End: 2025-05-07
Payer: COMMERCIAL

## 2025-05-07 VITALS
WEIGHT: 163 LBS | BODY MASS INDEX: 27.16 KG/M2 | HEART RATE: 57 BPM | HEIGHT: 65 IN | SYSTOLIC BLOOD PRESSURE: 138 MMHG | DIASTOLIC BLOOD PRESSURE: 83 MMHG

## 2025-05-07 DIAGNOSIS — M26.609 TMJ (TEMPOROMANDIBULAR JOINT DISORDER): ICD-10-CM

## 2025-05-07 DIAGNOSIS — H02.409 PTOSIS OF EYELID, UNSPECIFIED LATERALITY: ICD-10-CM

## 2025-05-07 DIAGNOSIS — G43.709 CHRONIC MIGRAINE W/O AURA W/O STATUS MIGRAINOSUS, NOT INTRACTABLE: Primary | ICD-10-CM

## 2025-05-07 PROCEDURE — 99214 OFFICE O/P EST MOD 30 MIN: CPT | Performed by: PSYCHIATRY & NEUROLOGY

## 2025-05-07 RX ORDER — FREMANEZUMAB-VFRM 225 MG/1.5ML
225 INJECTION SUBCUTANEOUS
Qty: 1 ADJUSTABLE DOSE PRE-FILLED PEN SYRINGE | Refills: 11 | Status: SHIPPED | OUTPATIENT
Start: 2025-05-07 | End: 2026-05-07

## 2025-05-07 RX ORDER — FREMANEZUMAB-VFRM 225 MG/1.5ML
225 INJECTION SUBCUTANEOUS
Qty: 1 ADJUSTABLE DOSE PRE-FILLED PEN SYRINGE | Refills: 0 | COMMUNITY
Start: 2025-05-07

## 2025-05-07 NOTE — PROGRESS NOTES
[facility] NEUROLOGY SPECIALIST  [address]  [place] [address]  Dept: [phone]    PATIENT NAME: Leonor BOWMAN  PATIENT MRN: [id number]  PRIMARY CARE PHYSICIAN: Rolo Elias MD    HPI:      Leonor BOWMAN is a 50 y.o. female who I initially saw on 7/30/2024 for evaluation of headache.  She previously was followed by neurology in [place] and was doing very well on Botox for preventative therapy and rimegepant for abortive therapy.  Her history is summarized as follows:     Leonor BOWMAN reports that she initially developed having headaches when she was a teenager.  In adulthood, she developed complicated migraine with strokelike symptoms.  She reports that she initially has problems with her vision and sees a tunnels.  This is followed by bilateral hand numbness.  This progresses into difficulty speaking and she is not able to find words.  Ultimately, she \"shuts down\" and has decreased responsiveness.  She has been admitted to the hospital with suspected TIA in the past.  She reports that she has about 5 complicated migraine attacks per year.  She reports that headaches became worse as well and at 1 point she was having daily headaches of at least moderate severity and 12 severe headache days per month.  She took numerous medications as preventative therapies without benefit including gabapentin, lamotrigine, topiramate, nortriptyline, magnesium oxide, and Aimovig.  Since starting Botox, she had a decrease in headache frequency from 30 headache days per month to 5-6 headache days per month.  She has tried multiple abortive therapies including sumatriptan and rizatriptan without benefit.  She does have a resolution of headache soon after taking rimegepant 75 mg ODT.  Unfortunately, there has been difficulty getting this approved.    Unfortunately, she developed ptosis after an injection in 2024.  We avoided  and injected high on frontalis, but she reported

## 2025-05-09 ENCOUNTER — HOSPITAL ENCOUNTER (OUTPATIENT)
Age: 50
Setting detail: SPECIMEN
Discharge: HOME | End: 2025-05-09
Payer: COMMERCIAL

## 2025-05-09 LAB
25(OH)D3 SERPL-MCNC: 31.6 NG/ML (ref 30–100)
ALBUMIN SERPL-MCNC: 4.3 G/DL (ref 3.5–5.2)
ALBUMIN/GLOB SERPL: 1.4 {RATIO} (ref 1–2.5)
ALP SERPL-CCNC: 72 U/L (ref 35–104)
ALT SERPL-CCNC: 15 U/L (ref 10–35)
ANION GAP SERPL CALCULATED.3IONS-SCNC: 10 MMOL/L (ref 9–16)
AST SERPL-CCNC: 25 U/L (ref 10–35)
BILIRUB SERPL-MCNC: 0.5 MG/DL (ref 0–1.2)
BUN SERPL-MCNC: 16 MG/DL (ref 6–20)
CALCIUM SERPL-MCNC: 9 MG/DL (ref 8.6–10.4)
CHLORIDE SERPL-SCNC: 104 MMOL/L (ref 98–107)
CHOLEST SERPL-MCNC: 158 MG/DL (ref 0–199)
CHOLESTEROL/HDL RATIO: 1.4
CO2 SERPL-SCNC: 26 MMOL/L (ref 20–31)
CREAT SERPL-MCNC: 0.8 MG/DL (ref 0.5–0.9)
ERYTHROCYTE [DISTWIDTH] IN BLOOD BY AUTOMATED COUNT: 14 % (ref 11.8–14.4)
EST. AVERAGE GLUCOSE BLD GHB EST-MCNC: 85 MG/DL
FOLATE SERPL-MCNC: 9.2 NG/ML (ref 4.8–24.2)
GFR, ESTIMATED: 87 ML/MIN/1.73M2
GLUCOSE SERPL-MCNC: 82 MG/DL (ref 74–99)
HBA1C MFR BLD: 4.6 % (ref 4–6)
HCT VFR BLD AUTO: 39.3 % (ref 36.3–47.1)
HDLC SERPL-MCNC: 109 MG/DL
HGB BLD-MCNC: 12.5 G/DL (ref 11.9–15.1)
LDLC SERPL CALC-MCNC: 37 MG/DL (ref 0–100)
MAGNESIUM SERPL-MCNC: 2.1 MG/DL (ref 1.6–2.6)
MCH RBC QN AUTO: 28.1 PG (ref 25.2–33.5)
MCHC RBC AUTO-ENTMCNC: 31.8 G/DL (ref 28.4–34.8)
MCV RBC AUTO: 88.3 FL (ref 82.6–102.9)
NRBC BLD-RTO: 0 PER 100 WBC
PLATELET # BLD AUTO: 212 K/UL (ref 138–453)
PMV BLD AUTO: 10.1 FL (ref 8.1–13.5)
POTASSIUM SERPL-SCNC: 4.1 MMOL/L (ref 3.7–5.3)
PROT SERPL-MCNC: 7.4 G/DL (ref 6.6–8.7)
RBC # BLD AUTO: 4.45 M/UL (ref 3.95–5.11)
SODIUM SERPL-SCNC: 141 MMOL/L (ref 136–145)
TRIGL SERPL-MCNC: 58 MG/DL
VIT B12 SERPL-MCNC: 483 PG/ML (ref 232–1245)
VLDLC SERPL CALC-MCNC: 12 MG/DL (ref 1–30)
WBC OTHER # BLD: 4.4 K/UL (ref 3.5–11.3)

## 2025-05-09 PROCEDURE — 85027 COMPLETE CBC AUTOMATED: CPT

## 2025-05-09 PROCEDURE — 83735 ASSAY OF MAGNESIUM: CPT

## 2025-05-09 PROCEDURE — 80061 LIPID PANEL: CPT

## 2025-05-09 PROCEDURE — 82746 ASSAY OF FOLIC ACID SERUM: CPT

## 2025-05-09 PROCEDURE — 36415 COLL VENOUS BLD VENIPUNCTURE: CPT

## 2025-05-09 PROCEDURE — 82306 VITAMIN D 25 HYDROXY: CPT

## 2025-05-09 PROCEDURE — 82607 VITAMIN B-12: CPT

## 2025-05-09 PROCEDURE — 80053 COMPREHEN METABOLIC PANEL: CPT

## 2025-05-09 PROCEDURE — 83036 HEMOGLOBIN GLYCOSYLATED A1C: CPT

## 2025-05-13 ENCOUNTER — OFFICE VISIT (OUTPATIENT)
Dept: FAMILY MEDICINE CLINIC | Age: 50
End: 2025-05-13
Payer: COMMERCIAL

## 2025-05-13 ENCOUNTER — TELEPHONE (OUTPATIENT)
Dept: NEUROLOGY | Age: 50
End: 2025-05-13

## 2025-05-13 ENCOUNTER — RESULTS FOLLOW-UP (OUTPATIENT)
Dept: FAMILY MEDICINE CLINIC | Age: 50
End: 2025-05-13

## 2025-05-13 VITALS
SYSTOLIC BLOOD PRESSURE: 110 MMHG | DIASTOLIC BLOOD PRESSURE: 70 MMHG | WEIGHT: 164 LBS | HEIGHT: 64 IN | BODY MASS INDEX: 28 KG/M2

## 2025-05-13 DIAGNOSIS — E55.9 VITAMIN D DEFICIENCY: ICD-10-CM

## 2025-05-13 DIAGNOSIS — G43.019 INTRACTABLE MIGRAINE WITHOUT AURA AND WITHOUT STATUS MIGRAINOSUS: Primary | ICD-10-CM

## 2025-05-13 DIAGNOSIS — Z76.89 ENCOUNTER FOR WEIGHT MANAGEMENT: ICD-10-CM

## 2025-05-13 DIAGNOSIS — Z90.3 S/P GASTRIC SLEEVE PROCEDURE: ICD-10-CM

## 2025-05-13 DIAGNOSIS — E66.3 OVERWEIGHT (BMI 25.0-29.9): ICD-10-CM

## 2025-05-13 PROCEDURE — 99214 OFFICE O/P EST MOD 30 MIN: CPT | Performed by: FAMILY MEDICINE

## 2025-05-13 RX ORDER — TOPIRAMATE 50 MG/1
50 TABLET, FILM COATED ORAL 2 TIMES DAILY
Qty: 60 TABLET | Refills: 3 | Status: SHIPPED | OUTPATIENT
Start: 2025-05-13

## 2025-05-13 RX ORDER — METFORMIN HYDROCHLORIDE 500 MG/1
500 TABLET, EXTENDED RELEASE ORAL
Qty: 90 TABLET | Refills: 1 | Status: SHIPPED | OUTPATIENT
Start: 2025-05-13

## 2025-05-13 ASSESSMENT — ENCOUNTER SYMPTOMS
WHEEZING: 0
CONSTIPATION: 0
SHORTNESS OF BREATH: 0
RECTAL PAIN: 0
BACK PAIN: 0
DIARRHEA: 0
ABDOMINAL DISTENTION: 0
BLOOD IN STOOL: 0
SORE THROAT: 0
CHEST TIGHTNESS: 0
COLOR CHANGE: 0
COUGH: 0
EYE REDNESS: 0
NAUSEA: 0
SINUS PRESSURE: 0
STRIDOR: 0
RHINORRHEA: 0
ABDOMINAL PAIN: 0
TROUBLE SWALLOWING: 0
VOMITING: 0

## 2025-05-13 NOTE — PROGRESS NOTES
Visit Information    Have you changed or started any medications since your last visit including any over-the-counter medicines, vitamins, or herbal medicines? no   Are you having any side effects from any of your medications? -  no  Have you stopped taking any of your medications? Is so, why? -  no    Have you seen any other physician or provider since your last visit? Yes - Records Obtained  Have you had any other diagnostic tests since your last visit? Yes   Have you been seen in the emergency room and/or had an admission to a hospital since we last saw you? No  Have you had your routine dental cleaning in the past 6 months? no    Have you activated your [employer] account? If not, what are your barriers? Yes     Patient Care Team:  Rolo Elias MD as PCP - General (Family Medicine)  Rolo Elias MD as PCP - Empaneled Provider  Surendra Sun DO as Consulting Physician (Obstetrics & Gynecology)    Medical History Review  Past Medical, Family, and Social History reviewed and does contribute to the patient presenting condition    Health Maintenance   Topic Date Due    Breast cancer screen  03/14/2019    COVID-19 Vaccine (6 - 2024-25 season) 09/01/2024    Depression Screen  01/18/2026    DTaP/Tdap/Td vaccine (2 - Td or Tdap) 10/07/2027    Colorectal Cancer Screen  07/25/2029    Lipids  05/09/2030    Flu vaccine  Completed    Hepatitis C screen  Completed    HIV screen  Completed    Hepatitis A vaccine  Aged Out    Hib vaccine  Aged Out    Polio vaccine  Aged Out    Meningococcal (ACWY) vaccine  Aged Out    Meningococcal B vaccine  Aged Out    Pneumococcal 0-49 years Vaccine  Aged Out    Hepatitis B vaccine  Discontinued    Diabetes screen  Discontinued    Cervical cancer screen  Discontinued

## 2025-05-13 NOTE — PROGRESS NOTES
Chief Complaint   Patient presents with    Weight Management     The patient (or guardian, if applicable) and other individuals in attendance with the patient were advised that Artificial Intelligence will be utilized during this visit to record, process the conversation to generate a clinical note, and support improvement of the AI technology. The patient (or guardian, if applicable) and other individuals in attendance at the appointment consented to the use of AI, including the recording.                    Weight Management      History of Present Illness  The patient presents for weight management and migraine.    She was last seen in 02/2025, during which a new medication regimen was initiated. Despite efforts to maintain a healthy diet and incorporate exercise into her routine, she continues to struggle with cravings for unhealthy foods. Physical activity primarily consists of walking, averaging 12,000 steps per day. She has also incorporated resistance training using stretchy bands but has not been consistent with this. Cravings occur several times throughout the day, typically in the late morning or early afternoon, and again around 5:00 PM. She consumes zero-sugar soda and abstains from alcohol. There is no history of kidney stones. She attempted to use Wellbutrin and naltrexone on three occasions but experienced intolerable side effects described as a sensation of her skin crawling. Metformin has not yet been tried.    Patient did not antibiotics in the past lost about 20 pounds but gained 8 pounds back.  Patient is currently doing 300 minutes of daily walks but not consistent with the dietary changes.    All blood work discussed with patient overall there is improvement in the blood work including vitamin D and also cholesterol is normal blood counts are normal.    She has previously used Topamax for migraine management but has since transitioned to Ajovy. Recently, Botox was discontinued due to the

## 2025-05-16 NOTE — TELEPHONE ENCOUNTER
Received a fax form from [employer] asking for additional information. This was filled out and faxed back with chart notes.

## 2025-05-20 NOTE — TELEPHONE ENCOUNTER
"Oncology Rooming Note    January 16, 2025 10:47 AM   Maxx Canales is a 70 year old male who presents for:    No chief complaint on file.    Initial Vitals: There were no vitals taken for this visit. Estimated body mass index is 32.6 kg/m  as calculated from the following:    Height as of 1/15/25: 1.676 m (5' 6\").    Weight as of 1/15/25: 91.6 kg (202 lb). There is no height or weight on file to calculate BSA.  Data Unavailable Comment: Data Unavailable   No LMP for male patient.  Allergies reviewed: Yes  Medications reviewed: Yes    Medications: Medication refills not needed today.  Pharmacy name entered into [employer]:    [id number] - [place], [place] - [address] [address]  [facility] - [place], [place] - [address]    Frailty Screening:   Is the patient here for a new oncology consult visit in cancer care? 2. No      Clinical concerns: none       Shell Hall LPN              " Medication was approved. Pt informed via [employer].

## 2025-05-21 ENCOUNTER — ENROLLMENT (OUTPATIENT)
Age: 50
End: 2025-05-21

## 2025-05-27 ENCOUNTER — TELEPHONE (OUTPATIENT)
Age: 50
End: 2025-05-27

## 2025-05-30 ENCOUNTER — TELEPHONE (OUTPATIENT)
Age: 50
End: 2025-05-30

## 2025-05-30 NOTE — TELEPHONE ENCOUNTER
Specialty Medication Service    Date: 5/30/2025  Patient's Name: Leonor BOWMAN YOB: 1975            _____________________________________________________________________________________________    Patient transferred from [facility] to schedule PharmD initial appointment for Specialty Medication Services. Patient scheduled 6/2/2025.  Most recent office notes from Jolanta Little in patient chart.    Chavo Yepez PharmD [facility]  Ambulatory Clinical Pharmacist  Specialty Medication Services  Phone: [phone]  Fax: [phone]    For Pharmacy Admin Tracking Only    Program: [facility]  CPA in place:  No  Recommendation Provided To: Patient/Caregiver: 1 via Telephone  Intervention Detail: Scheduled Appointment  Intervention Accepted By: Patient/Caregiver: 1  Time Spent (min): 15

## 2025-06-02 ENCOUNTER — HOSPITAL ENCOUNTER (OUTPATIENT)
Dept: WOMENS IMAGING | Age: 50
Discharge: HOME | End: 2025-06-04
Payer: COMMERCIAL

## 2025-06-02 ENCOUNTER — PHARMACY VISIT (OUTPATIENT)
Age: 50
End: 2025-06-02

## 2025-06-02 DIAGNOSIS — G43.019 INTRACTABLE MIGRAINE WITHOUT AURA AND WITHOUT STATUS MIGRAINOSUS: Primary | ICD-10-CM

## 2025-06-02 DIAGNOSIS — Z12.31 ENCOUNTER FOR SCREENING MAMMOGRAM FOR HIGH-RISK PATIENT: ICD-10-CM

## 2025-06-02 PROCEDURE — 77063 BREAST TOMOSYNTHESIS BI: CPT

## 2025-06-02 ASSESSMENT — PROMIS GLOBAL HEALTH SCALE
IN GENERAL, WOULD YOU SAY YOUR HEALTH IS...[ON A SCALE OF 1 (POOR) TO 5 (EXCELLENT)]: VERY GOOD
IN GENERAL, WOULD YOU SAY YOUR QUALITY OF LIFE IS...[ON A SCALE OF 1 (POOR) TO 5 (EXCELLENT)]: VERY GOOD
IN THE PAST 7 DAYS, HOW WOULD YOU RATE YOUR PAIN ON AVERAGE [ON A SCALE FROM 0 (NO PAIN) TO 10 (WORST IMAGINABLE PAIN)]?: 5
IN THE PAST 7 DAYS, HOW WOULD YOU RATE YOUR FATIGUE ON AVERAGE [ON A SCALE FROM 1 (NONE) TO 5 (VERY SEVERE)]?: MILD
HOW IS THE PROMIS V1.1 BEING ADMINISTERED?: TELEPHONE
SUM OF RESPONSES TO QUESTIONS 3, 6, 7, & 8: 18
IN GENERAL, HOW WOULD YOU RATE YOUR SATISFACTION WITH YOUR SOCIAL ACTIVITIES AND RELATIONSHIPS [ON A SCALE OF 1 (POOR) TO 5 (EXCELLENT)]?: GOOD
SUM OF RESPONSES TO QUESTIONS 2, 4, 5, & 10: 16
WHO IS THE PERSON COMPLETING THE PROMIS V1.1 SURVEY?: SELF
IN GENERAL, HOW WOULD YOU RATE YOUR MENTAL HEALTH, INCLUDING YOUR MOOD AND YOUR ABILITY TO THINK [ON A SCALE OF 1 (POOR) TO 5 (EXCELLENT)]?: VERY GOOD
IN GENERAL, HOW WOULD YOU RATE YOUR PHYSICAL HEALTH [ON A SCALE OF 1 (POOR) TO 5 (EXCELLENT)]?: VERY GOOD
IN THE PAST 7 DAYS, HOW OFTEN HAVE YOU BEEN BOTHERED BY EMOTIONAL PROBLEMS, SUCH AS FEELING ANXIOUS, DEPRESSED, OR IRRITABLE [ON A SCALE FROM 1 (NEVER) TO 5 (ALWAYS)]?: NEVER
TO WHAT EXTENT ARE YOU ABLE TO CARRY OUT YOUR EVERYDAY PHYSICAL ACTIVITIES SUCH AS WALKING, CLIMBING STAIRS, CARRYING GROCERIES, OR MOVING A CHAIR [ON A SCALE OF 1 (NOT AT ALL) TO 5 (COMPLETELY)]?: COMPLETELY
IN GENERAL, PLEASE RATE HOW WELL YOU CARRY OUT YOUR USUAL SOCIAL ACTIVITIES (INCLUDES ACTIVITIES AT HOME, AT WORK, AND IN YOUR COMMUNITY, AND RESPONSIBILITIES AS A PARENT, CHILD, SPOUSE, EMPLOYEE, FRIEND, ETC) [ON A SCALE OF 1 (POOR) TO 5 (EXCELLENT)]?: GOOD

## 2025-06-02 NOTE — PROGRESS NOTES
[phone]  [facility]  Phone: [phone] option 1    For Pharmacy Admin Tracking Only    Program: SMS  CPA in place:  No  Recommendation Provided To: Provider: 1 via Note to Provider, Patient/Caregiver: 2 via Virtual Visit, and Pharmacy: 1  Intervention Detail: Benefit Assistance, Refill(s) Provided, and Scheduled Appointment  Intervention Accepted By: Provider: 1, Patient/Caregiver: 2, and Pharmacy: 1  Time Spent (min): 60    Leonor Marin was evaluated through a synchronous (real-time) audio encounter. Patient identification was verified at the start of the visit. She (or guardian if applicable) is aware that this is a billable service, which includes applicable co-pays. This visit was conducted with the patient's (and/or legal guardian's) verbal consent. She has not had a related appointment within my department in the past 7 days or scheduled within the next 24 hours.   The patient was located at Home: [address] [place] [address]  [address]  [place] [id number].  The provider was located at Home (Appt Dept State): [place].  Confirm you are appropriately licensed, registered, or certified to deliver care in the state where the patient is located as indicated above. If you are not or unsure, please re-schedule the visit: Yes, I confirm.     Note: not billable if this call serves to triage the patient into an appointment for the relevant concern

## 2025-06-04 ENCOUNTER — TELEPHONE (OUTPATIENT)
Dept: NEUROLOGY | Age: 50
End: 2025-06-04

## 2025-06-04 DIAGNOSIS — G43.019 INTRACTABLE MIGRAINE WITHOUT AURA AND WITHOUT STATUS MIGRAINOSUS: Primary | ICD-10-CM

## 2025-06-04 NOTE — TELEPHONE ENCOUNTER
Specialty Medication Service    Date: 6/4/2025  Patient's Name: Leonor Marin YOB: 1975            _____________________________________________________________________________________________    Patient has been prescribed a medication currently on the [facility] Specialty Medication Service (SMS) formulary. Patient currently has [facility] pharmacy benefits and is required to enroll in the SMS program. A referral from your office is required to remain compliant with the SMS program. A referral has been pended for  Jolanta Little DO.    Jerel Burrell PharmD, [facility]  Ambulatory Clinical Pharmacist   [facility] Specialty Medication Service  Phone: [phone]  [facility]  Phone: [phone] option 1    For Pharmacy Admin Tracking Only    Program: [employer]  CPA in place:  No  Recommendation Provided To: Provider: 1 via Note to Provider  Intervention Detail: Referral to Other Provider  Intervention Accepted By: Provider: 1  Time Spent (min): 10

## 2025-06-09 ENCOUNTER — OFFICE VISIT (OUTPATIENT)
Dept: OBGYN CLINIC | Age: 50
End: 2025-06-09
Payer: COMMERCIAL

## 2025-06-09 ENCOUNTER — HOSPITAL ENCOUNTER (OUTPATIENT)
Age: 50
Setting detail: SPECIMEN
Discharge: HOME | End: 2025-06-09

## 2025-06-09 ENCOUNTER — OFFICE VISIT (OUTPATIENT)
Dept: FAMILY MEDICINE CLINIC | Age: 50
End: 2025-06-09
Payer: COMMERCIAL

## 2025-06-09 VITALS
WEIGHT: 161 LBS | BODY MASS INDEX: 27.49 KG/M2 | HEIGHT: 64 IN | SYSTOLIC BLOOD PRESSURE: 116 MMHG | HEART RATE: 84 BPM | DIASTOLIC BLOOD PRESSURE: 82 MMHG

## 2025-06-09 VITALS
WEIGHT: 159 LBS | TEMPERATURE: 97.4 F | DIASTOLIC BLOOD PRESSURE: 84 MMHG | HEART RATE: 65 BPM | BODY MASS INDEX: 26.49 KG/M2 | SYSTOLIC BLOOD PRESSURE: 139 MMHG | HEIGHT: 65 IN | OXYGEN SATURATION: 98 %

## 2025-06-09 DIAGNOSIS — Z90.710 S/P HYSTERECTOMY WITH OOPHORECTOMY: ICD-10-CM

## 2025-06-09 DIAGNOSIS — N89.8 VAGINAL DRYNESS: ICD-10-CM

## 2025-06-09 DIAGNOSIS — R68.82 DECREASED LIBIDO: ICD-10-CM

## 2025-06-09 DIAGNOSIS — N39.41 URGE INCONTINENCE: ICD-10-CM

## 2025-06-09 DIAGNOSIS — R53.82 CHRONIC FATIGUE: ICD-10-CM

## 2025-06-09 DIAGNOSIS — Z01.419 WELL WOMAN EXAM WITH ROUTINE GYNECOLOGICAL EXAM: Primary | ICD-10-CM

## 2025-06-09 DIAGNOSIS — Z79.890 HORMONE REPLACEMENT THERAPY: ICD-10-CM

## 2025-06-09 DIAGNOSIS — R39.9 UTI SYMPTOMS: ICD-10-CM

## 2025-06-09 DIAGNOSIS — Z12.4 PAP SMEAR FOR CERVICAL CANCER SCREENING: ICD-10-CM

## 2025-06-09 DIAGNOSIS — N99.3 VAGINAL VAULT PROLAPSE AFTER HYSTERECTOMY: ICD-10-CM

## 2025-06-09 DIAGNOSIS — Z11.51 ENCOUNTER FOR SCREENING FOR HUMAN PAPILLOMAVIRUS (HPV): ICD-10-CM

## 2025-06-09 DIAGNOSIS — N30.01 ACUTE CYSTITIS WITH HEMATURIA: Primary | ICD-10-CM

## 2025-06-09 DIAGNOSIS — Z80.3 FHX: BREAST CANCER IN FIRST DEGREE RELATIVE: ICD-10-CM

## 2025-06-09 DIAGNOSIS — Z90.721 S/P HYSTERECTOMY WITH OOPHORECTOMY: ICD-10-CM

## 2025-06-09 LAB
BILIRUBIN, POC: ABNORMAL
BLOOD URINE, POC: ABNORMAL
CANDIDA SPECIES: NEGATIVE
CLARITY, POC: ABNORMAL
COLOR, POC: ABNORMAL
GARDNERELLA VAGINALIS: NEGATIVE
GLUCOSE URINE, POC: ABNORMAL MG/DL
KETONES, POC: ABNORMAL MG/DL
LEUKOCYTE EST, POC: ABNORMAL
NITRITE, POC: ABNORMAL
PH, POC: 6.5
PROTEIN, POC: 100 MG/DL
SOURCE: NORMAL
SPECIFIC GRAVITY, POC: 1.02
TESTOST SERPL-MCNC: <3 NG/DL (ref 8–48)
TRICHOMONAS: NEGATIVE
TSH SERPL DL<=0.05 MIU/L-ACNC: 1.54 UIU/ML (ref 0.27–4.2)
UROBILINOGEN, POC: 1 MG/DL

## 2025-06-09 PROCEDURE — 99203 OFFICE O/P NEW LOW 30 MIN: CPT | Performed by: STUDENT IN AN ORGANIZED HEALTH CARE EDUCATION/TRAINING PROGRAM

## 2025-06-09 PROCEDURE — 99214 OFFICE O/P EST MOD 30 MIN: CPT

## 2025-06-09 PROCEDURE — 99386 PREV VISIT NEW AGE 40-64: CPT | Performed by: STUDENT IN AN ORGANIZED HEALTH CARE EDUCATION/TRAINING PROGRAM

## 2025-06-09 PROCEDURE — 81002 URINALYSIS NONAUTO W/O SCOPE: CPT

## 2025-06-09 PROCEDURE — 36415 COLL VENOUS BLD VENIPUNCTURE: CPT | Performed by: STUDENT IN AN ORGANIZED HEALTH CARE EDUCATION/TRAINING PROGRAM

## 2025-06-09 RX ORDER — NITROFURANTOIN 25; 75 MG/1; MG/1
100 CAPSULE ORAL 2 TIMES DAILY
Qty: 14 CAPSULE | Refills: 0 | Status: SHIPPED | OUTPATIENT
Start: 2025-06-09 | End: 2025-06-16

## 2025-06-09 ASSESSMENT — ENCOUNTER SYMPTOMS
DIARRHEA: 0
EYE ITCHING: 0
EYE REDNESS: 0
CHEST TIGHTNESS: 0
CONSTIPATION: 0
NAUSEA: 0
ABDOMINAL PAIN: 0
EYE PAIN: 0
ABDOMINAL DISTENTION: 0
VOMITING: 0
SHORTNESS OF BREATH: 0

## 2025-06-09 NOTE — PROGRESS NOTES
diarrhea, negative constipation, negative bowel changes, negative heartburn   Genitourinary: negative pelvic pain, negative dysuria, negative hematuria, positive urinary incontinence, negative vaginal discharge, negative vaginal bleeding  Dermatological: negative rash, negative pruritis, negative mole or other skin changes  Hematologic: negative bruising  Immunologic/Lymphatic: negative recent illness, negative recent sick contact  Musculoskeletal: negative back pain, negative myalgias, negative arthralgias  Neurological:  negative dizziness, negative migraines, negative seizures, negative weakness  Behavior/Psych: negative depression, negative anxiety, negative SI, negative HI    ________________________________________________________________________    GYNECOLOGICAL HISTORY:  Age of Menarche: 14  Age of Menopause: surgical menopause 2019    Sexually Active: sexually active with male partner  STD History: denies    Pap History: NILM, No HPV testing 10/2022, cervix in place  Hx Abnormal Pap: denies    Permanent Sterilization: yes - s/p hysterectomy  Reversible Birth Control: no  Hormone Replacement Exposure: yes - vivelle dot 0.1    HEALTH MAINTENANCE:  Gardasil immunization: not received  Date of Last Mammogram: pending, completed 25  Date of Last Colonoscopy:     OBSTETRICAL HISTORY:  OB History    Para Term  AB Living   4 4 3 1 0 4   SAB IAB Ectopic Molar Multiple Live Births   0 0 0 0 0 4      # Outcome Date GA Lbr Scotty/2nd Weight Sex Type Anes PTL Lv   4  12/20/10 35w2d  1.616 kg (3 lb 9 oz) F CS-LTranv Spinal  MINO      Birth Comments: PROM      Name: Gemma   3 Term 2004 40w0d  4.593 kg (10 lb 2 oz) M Vag-Spont   MINO   2 Term 2001 41w0d  3.856 kg (8 lb 8 oz) M Vag-Spont   MINO   1 Term 1998 39w0d  3.884 kg (8 lb 9 oz) M Vag-Spont   MINO       PAST MEDICAL HISTORY:   has a past medical history of Absolute anemia, Allergic rhinitis, Anxiety, BRCA1 negative, BRCA2 negative,

## 2025-06-09 NOTE — PROGRESS NOTES
prescribed medications.  All patient questions answered.  Pt voiced understanding.    Patient assumes risks associated with failure to complete recommended testing and treatments in a timely manner    Electronically signed by ARIANNA Erickson NP on 6/9/2025 at 6:47 PM

## 2025-06-10 DIAGNOSIS — R39.9 UTI SYMPTOMS: ICD-10-CM

## 2025-06-10 RX ORDER — ESTRADIOL 0.1 MG/D
1 FILM, EXTENDED RELEASE TRANSDERMAL
COMMUNITY

## 2025-06-11 LAB
HPV I/H RISK 4 DNA CVX QL NAA+PROBE: NOT DETECTED
HPV SAMPLE: NORMAL
HPV, INTERPRETATION: NORMAL
HPV16 DNA CVX QL NAA+PROBE: NOT DETECTED
HPV18 DNA CVX QL NAA+PROBE: NOT DETECTED
MICROORGANISM SPEC CULT: ABNORMAL
SERVICE CMNT-IMP: ABNORMAL
SPECIMEN DESCRIPTION: ABNORMAL
SPECIMEN DESCRIPTION: NORMAL

## 2025-06-12 ENCOUNTER — RESULTS FOLLOW-UP (OUTPATIENT)
Dept: FAMILY MEDICINE CLINIC | Age: 50
End: 2025-06-12

## 2025-06-12 ENCOUNTER — RESULTS FOLLOW-UP (OUTPATIENT)
Dept: OBGYN CLINIC | Age: 50
End: 2025-06-12

## 2025-06-14 LAB — CYTOLOGY REPORT: NORMAL

## 2025-06-17 ENCOUNTER — TELEPHONE (OUTPATIENT)
Dept: OBGYN CLINIC | Age: 50
End: 2025-06-17

## 2025-06-17 DIAGNOSIS — Z79.890 HORMONE REPLACEMENT THERAPY: Primary | ICD-10-CM

## 2025-06-18 NOTE — TELEPHONE ENCOUNTER
Patient was called to discuss the results of her recent labs. She is on estradiol patch for menopausal symptoms however still reports fatigue, low libido, and low energy. A recent Testosterone level was <3. The patient is interested in exploring therapy. She has previously underwent surgical menopause. We discussed potential systemic testosterone therapy as well as a topical preparation for intercourse. The patient is potentially interested in these. Given that previously her estrogen patch was working well for her, recommend estrogen level to ensure proper absorption of the medication. Then will bring patient in for f/u to further discuss testosterone therapy. All questions answered at this time.

## 2025-06-19 ENCOUNTER — RESULTS FOLLOW-UP (OUTPATIENT)
Dept: FAMILY MEDICINE CLINIC | Age: 50
End: 2025-06-19

## 2025-06-20 ENCOUNTER — HOSPITAL ENCOUNTER (OUTPATIENT)
Age: 50
Setting detail: SPECIMEN
Discharge: HOME | End: 2025-06-20
Payer: COMMERCIAL

## 2025-06-20 ENCOUNTER — TELEPHONE (OUTPATIENT)
Facility: HOSPITAL | Age: 50
End: 2025-06-20

## 2025-06-20 DIAGNOSIS — Z79.890 HORMONE REPLACEMENT THERAPY: ICD-10-CM

## 2025-06-20 LAB — ESTRADIOL LEVEL: 197 PG/ML

## 2025-06-20 PROCEDURE — 82670 ASSAY OF TOTAL ESTRADIOL: CPT

## 2025-06-20 PROCEDURE — 36415 COLL VENOUS BLD VENIPUNCTURE: CPT

## 2025-06-20 NOTE — TELEPHONE ENCOUNTER
Specialty Medication Service    Date: 6/20/2025  Patient's Name: Leonor Marin YOB: 1975            _____________________________________________________________________________________________    Sent QualyS[employer]et message to reschedule Medical Director  appointment for Specialty Medication Services. Please call: [phone] option 4. Will continue to outreach as appropriate.     Keeley Agosto CPhT  Clinical   Specialty Medication Services  Phone: [phone] option #4  Fax: [phone]      For Pharmacy Admin Tracking Only    Program: SMS  CPA in place:  No  Recommendation Provided To: Patient/Caregiver: 1 via Lumenishart Message  Intervention Detail: Scheduled Appointment  Intervention Accepted By: Patient/Caregiver: 0  Gap Closed?:    Time Spent (min): 10

## 2025-06-23 ENCOUNTER — RESULTS FOLLOW-UP (OUTPATIENT)
Dept: OBGYN CLINIC | Age: 50
End: 2025-06-23

## 2025-06-23 ENCOUNTER — OFFICE VISIT (OUTPATIENT)
Dept: OBGYN CLINIC | Age: 50
End: 2025-06-23
Payer: COMMERCIAL

## 2025-06-23 VITALS
DIASTOLIC BLOOD PRESSURE: 80 MMHG | SYSTOLIC BLOOD PRESSURE: 122 MMHG | WEIGHT: 165 LBS | HEART RATE: 62 BPM | BODY MASS INDEX: 27.88 KG/M2

## 2025-06-23 DIAGNOSIS — N89.8 VAGINAL DRYNESS: ICD-10-CM

## 2025-06-23 DIAGNOSIS — R68.82 DECREASED LIBIDO: Primary | ICD-10-CM

## 2025-06-23 DIAGNOSIS — R53.82 CHRONIC FATIGUE: ICD-10-CM

## 2025-06-23 DIAGNOSIS — N95.2 VAGINAL ATROPHY: ICD-10-CM

## 2025-06-23 PROCEDURE — 99213 OFFICE O/P EST LOW 20 MIN: CPT | Performed by: STUDENT IN AN ORGANIZED HEALTH CARE EDUCATION/TRAINING PROGRAM

## 2025-06-23 RX ORDER — TESTOSTERONE GEL, 1% 10 MG/G
GEL TRANSDERMAL
Qty: 3 EACH | Refills: 1 | Status: SHIPPED | OUTPATIENT
Start: 2025-06-23 | End: 2025-07-23

## 2025-06-23 RX ORDER — ESTRADIOL 0.1 MG/G
1 CREAM VAGINAL SEE ADMIN INSTRUCTIONS
Qty: 42.5 G | Refills: 3 | Status: SHIPPED | OUTPATIENT
Start: 2025-06-23

## 2025-06-23 NOTE — PROGRESS NOTES
OB/GYN Follow up Visit  [facility]     Leonor Marin  2025                       Primary Care Physician: Rolo Elias MD    CC:   Chief Complaint   Patient presents with    Results         HPI: Leonor Marin is a 50 y.o. female  here for follow up regarding decreased libido, fatigue, and vaginal dryness.    The patient is on estradiol patch since her oophorectomy. She reports this works well for hot flashes and night sweats but reports decreased libido and vaginal dryness. She completed recent estradiol level which was 197. We also ordered a testosterone level which was <3. The patient has interest in testosterone therapy. We also discussed lubrication and foreplay with intercourse. She declines a trial of any other medications like wellbutrin.    REVIEW OF SYSTEMS:   Constitutional: negative fever, negative chills, negative weight changes   HEENT: negative visual disturbances, negative headaches, negative dizziness, negative hearing loss  Breast: Negative breast abnormalities, negative breast lumps, negative nipple discharge  Respiratory: negative dyspnea, negative cough, negative SOB  Cardiovascular: negative chest pain,  negative palpitations, negative arrhythmia, negative syncope   Gastrointestinal: negative abdominal pain, negative RUQ pain, negative N/V, negative diarrhea, negative constipation, negative bowel changes, negative heartburn   Genitourinary: negative dysuria, negative hematuria, negative urinary incontinence, negative vaginal discharge, negative vaginal bleeding, positive dryness, decreased libido  Dermatological: negative rash, negative pruritis, negative mole or other skin changes  Hematologic: negative bruising  Immunologic/Lymphatic: negative recent illness, negative recent sick contact  Musculoskeletal: negative back pain, negative myalgias, negative arthralgias  Neurological:  negative dizziness, negative migraines, negative seizures, negative

## 2025-06-25 ENCOUNTER — PATIENT MESSAGE (OUTPATIENT)
Dept: FAMILY MEDICINE CLINIC | Age: 50
End: 2025-06-25

## 2025-06-25 DIAGNOSIS — N30.90 CYSTITIS: Primary | ICD-10-CM

## 2025-06-25 RX ORDER — LEVOFLOXACIN 250 MG/1
250 TABLET, FILM COATED ORAL DAILY
Qty: 3 TABLET | Refills: 0 | Status: SHIPPED | OUTPATIENT
Start: 2025-06-25 | End: 2025-06-28

## 2025-06-30 ENCOUNTER — TELEPHONE (OUTPATIENT)
Age: 50
End: 2025-06-30

## 2025-06-30 NOTE — TELEPHONE ENCOUNTER
Specialty Medication Service    Date: 6/30/2025  Patient's Name: Leonor Marin YOB: 1975            _____________________________________________________________________________________________    Left 3 messages, Sent aCommercehart message, and Sent text message to reschedule Medical Director  appointment for Specialty Medication Services. Please call: [phone] option 4. Will continue to outreach as appropriate. Sent link to schedule via Emulis. 3/9 attempts made, moving to next month.    Ramandeep Raya CPhT  Pharmacy   Specialty Medication Services   Phone: [phone] option 4      For Pharmacy Admin Tracking Only    Program: SMS  CPA in place:  Yes  Recommendation Provided To: Patient/Caregiver: 1 via Telephone and [employer] Message  Intervention Detail: Scheduled Appointment  Intervention Accepted By: Patient/Caregiver: 0  Gap Closed?:    Time Spent (min): 15

## 2025-07-22 ENCOUNTER — TELEPHONE (OUTPATIENT)
Dept: OBGYN CLINIC | Age: 50
End: 2025-07-22

## 2025-07-22 DIAGNOSIS — F52.0 HYPOACTIVE SEXUAL DESIRE DISORDER: Primary | ICD-10-CM

## 2025-07-22 NOTE — TELEPHONE ENCOUNTER
[facility] pharmacy called and they need clarification on Testerone prescription. The medication comes in a pump dispensing bottle and based on the quantity 3 each, they need clarification. In addition there is a prior auth approval for a complete different amount. Please review and clarify. Thank you

## 2025-07-23 RX ORDER — TESTOSTERONE GEL, 1% 10 MG/G
500 GEL TRANSDERMAL DAILY
Qty: 15 G | Refills: 2 | Status: SHIPPED | OUTPATIENT
Start: 2025-07-23 | End: 2025-10-21

## 2025-07-23 NOTE — TELEPHONE ENCOUNTER
Pharmacy received and medication was ordered and should arrive to [facility] pharmacy on 07/24/2025.

## 2025-07-28 ENCOUNTER — TELEPHONE (OUTPATIENT)
Age: 50
End: 2025-07-28

## 2025-07-28 NOTE — TELEPHONE ENCOUNTER
Specialty Medication Service    Date: 7/28/2025  Patient's Name: Leonor Marin YOB: 1975            _____________________________________________________________________________________________    Email received from [facility] in regard to current SMS formulary medication, Ajovy.  SMS override placed. Working to schedule pt for sms md initial.    Ramandeep Raya CPhT  Pharmacy   Specialty Medication Services   Phone: [phone] option 4      For Pharmacy Admin Tracking Only    Program: SMS  CPA in place:  Yes  Recommendation Provided To: Pharmacy: 1  Intervention Detail: Benefit Assistance  Intervention Accepted By: Pharmacy: 1  Gap Closed?:    Time Spent (min): 15

## 2025-07-31 ENCOUNTER — TELEMEDICINE (OUTPATIENT)
Dept: FAMILY MEDICINE CLINIC | Age: 50
End: 2025-07-31
Payer: COMMERCIAL

## 2025-07-31 DIAGNOSIS — Z90.3 S/P GASTRIC SLEEVE PROCEDURE: Primary | ICD-10-CM

## 2025-07-31 DIAGNOSIS — E55.9 VITAMIN D DEFICIENCY: ICD-10-CM

## 2025-07-31 DIAGNOSIS — Z76.89 ENCOUNTER FOR WEIGHT MANAGEMENT: ICD-10-CM

## 2025-07-31 DIAGNOSIS — E66.3 OVERWEIGHT (BMI 25.0-29.9): ICD-10-CM

## 2025-07-31 PROCEDURE — 99214 OFFICE O/P EST MOD 30 MIN: CPT | Performed by: FAMILY MEDICINE

## 2025-07-31 RX ORDER — PHENTERMINE HYDROCHLORIDE 37.5 MG/1
37.5 TABLET ORAL
Qty: 30 TABLET | Refills: 0 | Status: SHIPPED | OUTPATIENT
Start: 2025-07-31 | End: 2025-08-30

## 2025-07-31 SDOH — ECONOMIC STABILITY: FOOD INSECURITY: WITHIN THE PAST 12 MONTHS, YOU WORRIED THAT YOUR FOOD WOULD RUN OUT BEFORE YOU GOT MONEY TO BUY MORE.: NEVER TRUE

## 2025-07-31 SDOH — ECONOMIC STABILITY: FOOD INSECURITY: WITHIN THE PAST 12 MONTHS, THE FOOD YOU BOUGHT JUST DIDN'T LAST AND YOU DIDN'T HAVE MONEY TO GET MORE.: NEVER TRUE

## 2025-07-31 SDOH — ECONOMIC STABILITY: INCOME INSECURITY: IN THE LAST 12 MONTHS, WAS THERE A TIME WHEN YOU WERE NOT ABLE TO PAY THE MORTGAGE OR RENT ON TIME?: NO

## 2025-07-31 ASSESSMENT — ENCOUNTER SYMPTOMS
EYE REDNESS: 0
CONSTIPATION: 0
COLOR CHANGE: 0
TROUBLE SWALLOWING: 0
ABDOMINAL PAIN: 0
COUGH: 0
SHORTNESS OF BREATH: 0
SORE THROAT: 0
STRIDOR: 0
SINUS PRESSURE: 0
DIARRHEA: 0
CHEST TIGHTNESS: 0
WHEEZING: 0
BACK PAIN: 0
ABDOMINAL DISTENTION: 0
RHINORRHEA: 0
BLOOD IN STOOL: 0
NAUSEA: 0
VOMITING: 0
RECTAL PAIN: 0

## 2025-07-31 ASSESSMENT — PATIENT HEALTH QUESTIONNAIRE - PHQ9
SUM OF ALL RESPONSES TO PHQ QUESTIONS 1-9: 0
2. FEELING DOWN, DEPRESSED OR HOPELESS: NOT AT ALL
SUM OF ALL RESPONSES TO PHQ9 QUESTIONS 1 & 2: 0
1. LITTLE INTEREST OR PLEASURE IN DOING THINGS: NOT AT ALL
1. LITTLE INTEREST OR PLEASURE IN DOING THINGS: NOT AT ALL
SUM OF ALL RESPONSES TO PHQ QUESTIONS 1-9: 0
2. FEELING DOWN, DEPRESSED OR HOPELESS: NOT AT ALL

## 2025-07-31 NOTE — ASSESSMENT & PLAN NOTE
Labs are up-to-date, please start on Adipex for 3 months discussed with patient he had to be satisfied with the weight loss.  Discussed about the calorie count and cutting down on the 500 shamika and burning are 500 shamika/day.    Orders:    phentermine (ADIPEX-P) 37.5 MG tablet; Take 1 tablet by mouth every morning (before breakfast) for 30 days. Max Daily Amount: 37.5 mg

## 2025-07-31 NOTE — PROGRESS NOTES
Leonor Marin, was evaluated through a synchronous (real-time) audio-video encounter. The patient (or guardian if applicable) is aware that this is a billable service, which includes applicable co-pays. This Virtual Visit was conducted with patient's (and/or legal guardian's) consent. Patient identification was verified, and a caregiver was present when appropriate.   The patient was located at Home: [address]  [address]  [place] [address]  Provider was located at Facility (Appt Dept): [facility]  [address]  [place] [address]  Confirm you are appropriately licensed, registered, or certified to deliver care in the state where the patient is located as indicated above. If you are not or unsure, please re-schedule the visit: Yes, I confirm.     Leonor Marin (:  1975) is a Established patient, presenting virtually for evaluation of the following:      Below is the assessment and plan developed based on review of pertinent history, physical exam, labs, studies, and medications.     Assessment & Plan  S/P gastric sleeve procedure   Labs are up-to-date, please start on Adipex for 3 months discussed with patient he had to be satisfied with the weight loss.  Discussed about the calorie count and cutting down on the 500 shamika and burning are 500 shamika/day.    Orders:    phentermine (ADIPEX-P) 37.5 MG tablet; Take 1 tablet by mouth every morning (before breakfast) for 30 days. Max Daily Amount: 37.5 mg    Encounter for weight management   Restart on Adipex for 3 months follow-up in 1 month in the office.  Continue dietary and physical activity    Orders:    phentermine (ADIPEX-P) 37.5 MG tablet; Take 1 tablet by mouth every morning (before breakfast) for 30 days. Max Daily Amount: 37.5 mg    Overweight (BMI 25.0-29.9)   Labs are up-to-date follow-up in 1 month    Orders:    phentermine (ADIPEX-P) 37.5 MG tablet; Take 1 tablet by mouth every morning (before breakfast) for 30 days. Max Daily

## 2025-07-31 NOTE — ASSESSMENT & PLAN NOTE
Labs are up-to-date follow-up in 1 month    Orders:    phentermine (ADIPEX-P) 37.5 MG tablet; Take 1 tablet by mouth every morning (before breakfast) for 30 days. Max Daily Amount: 37.5 mg

## 2025-07-31 NOTE — PROGRESS NOTES
Visit Information    Have you changed or started any medications since your last visit including any over-the-counter medicines, vitamins, or herbal medicines? no   Are you having any side effects from any of your medications? -  no  Have you stopped taking any of your medications? Is so, why? -  no    Have you seen any other physician or provider since your last visit? No  Have you had any other diagnostic tests since your last visit? No  Have you been seen in the emergency room and/or had an admission to a hospital since we last saw you? No  Have you had your routine dental cleaning in the past 6 months? no    Have you activated your [facility] account? If not, what are your barriers? Yes     Patient Care Team:  Rolo Elias MD as PCP - General (Family Medicine)  Rolo Elias MD as PCP - Empaneled Provider  Surendra Sun DO as Consulting Physician (Obstetrics & Gynecology)    Medical History Review  Past Medical, Family, and Social History reviewed and does contribute to the patient presenting condition    Health Maintenance   Topic Date Due    COVID-19 Vaccine (6 - 2024-25 season) 09/01/2024    Flu vaccine (1) 08/01/2025    Depression Screen  01/18/2026    Breast cancer screen  06/02/2026    DTaP/Tdap/Td vaccine (2 - Td or Tdap) 10/07/2027    Colorectal Cancer Screen  07/25/2029    Lipids  05/09/2030    Hepatitis C screen  Completed    HIV screen  Completed    Hepatitis A vaccine  Aged Out    Hib vaccine  Aged Out    Polio vaccine  Aged Out    Meningococcal (ACWY) vaccine  Aged Out    Meningococcal B vaccine  Aged Out    Pneumococcal 0-49 years Vaccine  Aged Out    Hepatitis B vaccine  Discontinued    Diabetes screen  Discontinued    Cervical cancer screen  Discontinued

## 2025-08-04 ENCOUNTER — TELEPHONE (OUTPATIENT)
Age: 50
End: 2025-08-04

## 2025-08-04 ENCOUNTER — OFFICE VISIT (OUTPATIENT)
Age: 50
End: 2025-08-04
Payer: COMMERCIAL

## 2025-08-04 VITALS
HEIGHT: 64 IN | WEIGHT: 170.2 LBS | BODY MASS INDEX: 29.06 KG/M2 | DIASTOLIC BLOOD PRESSURE: 72 MMHG | HEART RATE: 64 BPM | TEMPERATURE: 97.8 F | OXYGEN SATURATION: 100 % | SYSTOLIC BLOOD PRESSURE: 114 MMHG

## 2025-08-04 DIAGNOSIS — N39.0 RECURRENT UTI: ICD-10-CM

## 2025-08-04 DIAGNOSIS — N81.6 BADEN-WALKER GRADE 1 RECTOCELE: ICD-10-CM

## 2025-08-04 DIAGNOSIS — R33.9 RETENTION OF URINE: ICD-10-CM

## 2025-08-04 DIAGNOSIS — N39.41 URGE INCONTINENCE: ICD-10-CM

## 2025-08-04 DIAGNOSIS — R31.0 FRANK HEMATURIA: ICD-10-CM

## 2025-08-04 DIAGNOSIS — N81.10 BADEN-WALKER GRADE 1 CYSTOCELE: ICD-10-CM

## 2025-08-04 DIAGNOSIS — N94.10 DYSPAREUNIA, FEMALE: ICD-10-CM

## 2025-08-04 DIAGNOSIS — N32.81 OAB (OVERACTIVE BLADDER): ICD-10-CM

## 2025-08-04 DIAGNOSIS — R39.15 URGENCY OF URINATION: Primary | ICD-10-CM

## 2025-08-04 LAB
BILIRUBIN, POC: ABNORMAL
BLOOD URINE, POC: ABNORMAL
CLARITY, POC: CLEAR
COLOR, POC: YELLOW
GLUCOSE URINE, POC: ABNORMAL MG/DL
KETONES, POC: ABNORMAL MG/DL
LEUKOCYTE EST, POC: ABNORMAL
NITRITE, POC: ABNORMAL
PH, POC: 6
PROTEIN, POC: ABNORMAL MG/DL
SPECIFIC GRAVITY, POC: 1.02
UROBILINOGEN, POC: 1 MG/DL

## 2025-08-04 PROCEDURE — 81003 URINALYSIS AUTO W/O SCOPE: CPT

## 2025-08-04 PROCEDURE — 51798 US URINE CAPACITY MEASURE: CPT

## 2025-08-04 PROCEDURE — 99204 OFFICE O/P NEW MOD 45 MIN: CPT

## 2025-08-08 DIAGNOSIS — E55.9 VITAMIN D DEFICIENCY: ICD-10-CM

## 2025-08-08 RX ORDER — ERGOCALCIFEROL 1.25 MG/1
CAPSULE, LIQUID FILLED ORAL
Qty: 12 CAPSULE | Refills: 1 | Status: SHIPPED | OUTPATIENT
Start: 2025-08-08

## 2025-08-15 DIAGNOSIS — G43.109 MIGRAINE WITH AURA AND WITHOUT STATUS MIGRAINOSUS, NOT INTRACTABLE: ICD-10-CM

## 2025-08-18 ENCOUNTER — OFFICE VISIT (OUTPATIENT)
Dept: NEUROLOGY | Age: 50
End: 2025-08-18
Payer: COMMERCIAL

## 2025-08-18 VITALS
BODY MASS INDEX: 28.16 KG/M2 | WEIGHT: 169 LBS | HEART RATE: 62 BPM | DIASTOLIC BLOOD PRESSURE: 92 MMHG | SYSTOLIC BLOOD PRESSURE: 143 MMHG | HEIGHT: 65 IN

## 2025-08-18 DIAGNOSIS — M26.609 TMJ (TEMPOROMANDIBULAR JOINT DISORDER): ICD-10-CM

## 2025-08-18 DIAGNOSIS — G43.709 CHRONIC MIGRAINE W/O AURA W/O STATUS MIGRAINOSUS, NOT INTRACTABLE: Primary | ICD-10-CM

## 2025-08-18 PROCEDURE — 99213 OFFICE O/P EST LOW 20 MIN: CPT | Performed by: PSYCHIATRY & NEUROLOGY

## 2025-08-27 ENCOUNTER — PROCEDURE VISIT (OUTPATIENT)
Age: 50
End: 2025-08-27

## 2025-08-27 VITALS — HEART RATE: 59 BPM | OXYGEN SATURATION: 100 % | DIASTOLIC BLOOD PRESSURE: 78 MMHG | SYSTOLIC BLOOD PRESSURE: 122 MMHG

## 2025-08-27 DIAGNOSIS — N39.41 URGE INCONTINENCE: ICD-10-CM

## 2025-08-27 DIAGNOSIS — N81.6 BADEN-WALKER GRADE 1 RECTOCELE: ICD-10-CM

## 2025-08-27 DIAGNOSIS — N81.10 BADEN-WALKER GRADE 1 CYSTOCELE: ICD-10-CM

## 2025-08-27 DIAGNOSIS — R33.9 RETENTION OF URINE: ICD-10-CM

## 2025-08-27 DIAGNOSIS — N94.10 DYSPAREUNIA, FEMALE: ICD-10-CM

## 2025-08-27 DIAGNOSIS — N39.0 RECURRENT UTI: Primary | ICD-10-CM

## 2025-08-27 DIAGNOSIS — N32.81 OAB (OVERACTIVE BLADDER): ICD-10-CM

## 2025-08-27 DIAGNOSIS — R39.15 URGENCY OF URINATION: ICD-10-CM

## 2025-08-27 DIAGNOSIS — R31.0 FRANK HEMATURIA: ICD-10-CM

## 2025-08-27 LAB
BILIRUBIN, POC: NORMAL
BLOOD URINE, POC: NORMAL
CLARITY, POC: CLEAR
COLOR, POC: YELLOW
GLUCOSE URINE, POC: NORMAL MG/DL
KETONES, POC: NORMAL MG/DL
LEUKOCYTE EST, POC: NORMAL
NITRITE, POC: NORMAL
PH, POC: 5
PROTEIN, POC: NORMAL MG/DL
SPECIFIC GRAVITY, POC: 1.02
UROBILINOGEN, POC: NORMAL MG/DL

## 2025-08-27 RX ADMIN — Medication 500 MG: at 16:17

## 2025-09-04 ENCOUNTER — TELEPHONE (OUTPATIENT)
Dept: FAMILY MEDICINE CLINIC | Age: 50
End: 2025-09-04

## 2025-09-05 ENCOUNTER — TELEPHONE (OUTPATIENT)
Age: 50
End: 2025-09-05